# Patient Record
Sex: MALE | Race: WHITE | Employment: OTHER | URBAN - METROPOLITAN AREA
[De-identification: names, ages, dates, MRNs, and addresses within clinical notes are randomized per-mention and may not be internally consistent; named-entity substitution may affect disease eponyms.]

---

## 2021-12-15 ENCOUNTER — OFFICE VISIT (OUTPATIENT)
Dept: ORTHOPEDIC SURGERY | Age: 66
End: 2021-12-15
Payer: MEDICARE

## 2021-12-15 VITALS — HEIGHT: 73 IN | WEIGHT: 248 LBS | BODY MASS INDEX: 32.87 KG/M2

## 2021-12-15 DIAGNOSIS — M17.12 OSTEOARTHRITIS OF LEFT KNEE, UNSPECIFIED OSTEOARTHRITIS TYPE: Primary | ICD-10-CM

## 2021-12-15 PROCEDURE — 99214 OFFICE O/P EST MOD 30 MIN: CPT | Performed by: ORTHOPAEDIC SURGERY

## 2021-12-15 PROCEDURE — G8432 DEP SCR NOT DOC, RNG: HCPCS | Performed by: ORTHOPAEDIC SURGERY

## 2021-12-15 PROCEDURE — 3017F COLORECTAL CA SCREEN DOC REV: CPT | Performed by: ORTHOPAEDIC SURGERY

## 2021-12-15 PROCEDURE — 1101F PT FALLS ASSESS-DOCD LE1/YR: CPT | Performed by: ORTHOPAEDIC SURGERY

## 2021-12-15 PROCEDURE — G8417 CALC BMI ABV UP PARAM F/U: HCPCS | Performed by: ORTHOPAEDIC SURGERY

## 2021-12-15 PROCEDURE — G8536 NO DOC ELDER MAL SCRN: HCPCS | Performed by: ORTHOPAEDIC SURGERY

## 2021-12-15 PROCEDURE — G8427 DOCREV CUR MEDS BY ELIG CLIN: HCPCS | Performed by: ORTHOPAEDIC SURGERY

## 2021-12-15 PROCEDURE — 20610 DRAIN/INJ JOINT/BURSA W/O US: CPT | Performed by: ORTHOPAEDIC SURGERY

## 2021-12-15 RX ORDER — BUPIVACAINE HYDROCHLORIDE 2.5 MG/ML
1 INJECTION, SOLUTION INFILTRATION; PERINEURAL ONCE
Status: COMPLETED | OUTPATIENT
Start: 2021-12-15 | End: 2021-12-15

## 2021-12-15 RX ORDER — VALSARTAN 160 MG/1
TABLET ORAL
COMMUNITY
End: 2022-08-08

## 2021-12-15 RX ORDER — AMOXICILLIN 875 MG/1
TABLET, FILM COATED ORAL
Status: ON HOLD | COMMUNITY
End: 2022-01-20 | Stop reason: CLARIF

## 2021-12-15 RX ORDER — LIDOCAINE HYDROCHLORIDE 10 MG/ML
1 INJECTION INFILTRATION; PERINEURAL ONCE
Status: COMPLETED | OUTPATIENT
Start: 2021-12-15 | End: 2021-12-15

## 2021-12-15 RX ORDER — HYDROGEN PEROXIDE 3 %
SOLUTION, NON-ORAL MISCELLANEOUS
COMMUNITY
End: 2022-08-08

## 2021-12-15 RX ORDER — METHYLPREDNISOLONE ACETATE 40 MG/ML
80 INJECTION, SUSPENSION INTRA-ARTICULAR; INTRALESIONAL; INTRAMUSCULAR; SOFT TISSUE ONCE
Status: COMPLETED | OUTPATIENT
Start: 2021-12-15 | End: 2021-12-15

## 2021-12-15 RX ORDER — DEXTROMETHORPHAN HYDROBROMIDE, GUAIFENESIN 5; 100 MG/5ML; MG/5ML
LIQUID ORAL
Status: ON HOLD | COMMUNITY
End: 2022-01-20 | Stop reason: CLARIF

## 2021-12-15 RX ADMIN — BUPIVACAINE HYDROCHLORIDE 2.5 MG: 2.5 INJECTION, SOLUTION INFILTRATION; PERINEURAL at 10:07

## 2021-12-15 RX ADMIN — LIDOCAINE HYDROCHLORIDE 1 ML: 10 INJECTION INFILTRATION; PERINEURAL at 10:08

## 2021-12-15 RX ADMIN — METHYLPREDNISOLONE ACETATE 80 MG: 40 INJECTION, SUSPENSION INTRA-ARTICULAR; INTRALESIONAL; INTRAMUSCULAR; SOFT TISSUE at 10:08

## 2021-12-15 NOTE — PROGRESS NOTES
ASSESSMENT/PLAN:  Below is the assessment and plan developed based on review of pertinent history, physical exam, labs, studies, and medications. 1. Osteoarthritis of left knee, unspecified osteoarthritis type      No follow-ups on file. We discussed the possibility of an aspiration of the knee followed by an injection of a steroid mixed with a local anesthetic to relieve pain and decrease swelling and inflammation of the right knee. The risks of a steroid injection which include but are not limited to cartilage damage, death of nearby bone, joint infection, nerve damage, temporary facial flushing, temporary steroid flare of pain and inflammation in the joint, temporary increase in blood sugar, tendon weakening or rupture, thinning of nearby bone (osteoporosis), thinning of skin and soft tissue around the injection site, and whitening or lightening of the skin around the injection site were reviewed at length. We specifically advised that patients with diabetes talk to their primary care to ensure they are safe for a steroid injection due to the transient increase in blood sugar associated with the injection. We discussed the chance of increased bleeding and bruising if the patient is on blood thinners or certain dietary supplements that have a blood-thinning effect. We advised patients that have an active infection, history of allergic reactions to steroids or take medications that may prohibit them from receiving a steroid injection to talk to their primary care physician before receiving and injection. We also talked about limiting the number of injections because these potential side effects increase with larger doses and repeated use. After explaining the risks and benefits of the procedure and obtaining verbal informed consent from the patient, the proposed area for injection was confirmed with the patient.  After all questions and concerns were addressed, the skin was prepped with alcohol to reduce the chances of infection. The skin was anesthetized with topical ethylene chloride spray and a local anesthetic was injected into the skin around the site of aspiration. After the local anesthetic became affective, the knee fluid was aspirated, and the  mixture of two milliliters of Depo-Medrol (40mg/ml), one milliliter of Lidocaine and one milliliter of Marcaine was injected slowly into the intra-articular space of right knee in a sterile fashion without difficulty. The needle was removed and disposed of in a sterile container. The patient tolerated the injection well and a band-aid was placed on the skin. The patient was counseled on protecting the injection area, avoiding water submersion for 2 days, icing for pain relief and looking for signs and symptoms of infection. We requested that the patient contact us if any symptoms persist greater than 48 hours after the injection. After a long discussion regarding treatment options, we have elected to refer the patient to one of our knee replacement specialist for more comprehensive care of their arthritis. SUBJECTIVE/OBJECTIVE:  Char Steen (: 1955) is a 73 y.o. male, patient,here for evaluation of the No chief complaint on file. .   The patient returns today for follow-up of his left knee. He has known arthritis of the left knee. He underwent an aspiration injection in May 2021. He returns today for follow-up. Physical Exam    Upon physical examination, the patient is well developed, well nourished, alert and oriented times three, with normal mood and affect and walks with an antalgic gait. Upon examination of the left knee, the patient is tender to palpation along the medial joint line, and has an effusion. They have crepitus of the patellofemoral joint with range of motion and discomfort with patella grind testing. The patient has discomfort with Fina´s maneuvers, and the knee is stable.  They lack full flexion secondary to the effusion, but have full extension. They have 5/5 strength, and are neurovascularly intact distally. There is no erythema, warmth or skin lesions present. On examination of the contralateral extremity, the patient is nontender to palpation and has excellent range of motion, stability and strength. Imaging:    I have reviewed the patient´s previous diagnostic tests in an effort to support the diagnosis and treatment options. Not on File    No current outpatient medications on file. No current facility-administered medications for this visit. No past medical history on file. No past surgical history on file. No family history on file. Social History     Socioeconomic History    Marital status: Not on file     Spouse name: Not on file    Number of children: Not on file    Years of education: Not on file    Highest education level: Not on file   Occupational History    Not on file   Tobacco Use    Smoking status: Not on file    Smokeless tobacco: Not on file   Substance and Sexual Activity    Alcohol use: Not on file    Drug use: Not on file    Sexual activity: Not on file   Other Topics Concern    Not on file   Social History Narrative    Not on file     Social Determinants of Health     Financial Resource Strain:     Difficulty of Paying Living Expenses: Not on file   Food Insecurity:     Worried About Running Out of Food in the Last Year: Not on file    Nelida of Food in the Last Year: Not on file   Transportation Needs:     Lack of Transportation (Medical): Not on file    Lack of Transportation (Non-Medical):  Not on file   Physical Activity:     Days of Exercise per Week: Not on file    Minutes of Exercise per Session: Not on file   Stress:     Feeling of Stress : Not on file   Social Connections:     Frequency of Communication with Friends and Family: Not on file    Frequency of Social Gatherings with Friends and Family: Not on file    Attends Roman [employer] Services: Not on file    Active Member of Clubs or Organizations: Not on file    Attends Club or Organization Meetings: Not on file    Marital Status: Not on file   Intimate Partner Violence:     Fear of Current or Ex-Partner: Not on file    Emotionally Abused: Not on file    Physically Abused: Not on file    Sexually Abused: Not on file   Housing Stability:     Unable to Pay for Housing in the Last Year: Not on file    Number of [place] in the Last Year: Not on file    Unstable Housing in the Last Year: Not on file       Review of Systems    No flowsheet data found. Vitals: There were no vitals taken for this visit. There is no height or weight on file to calculate BMI. An electronic signature was used to authenticate this note.   -- Rosalina Anderson MD

## 2022-01-17 ENCOUNTER — TRANSCRIBE ORDER (OUTPATIENT)
Dept: REGISTRATION | Age: 67
End: 2022-01-17

## 2022-01-17 ENCOUNTER — HOSPITAL ENCOUNTER (OUTPATIENT)
Dept: PREADMISSION TESTING | Age: 67
Discharge: HOME | End: 2022-01-17
Attending: INTERNAL MEDICINE
Payer: MEDICARE

## 2022-01-17 DIAGNOSIS — U07.1 COVID-19: Primary | ICD-10-CM

## 2022-01-17 DIAGNOSIS — U07.1 COVID-19: ICD-10-CM

## 2022-01-17 LAB
SARS-COV-2, XPLCVT: NOT DETECTED
SOURCE, COVRS: NORMAL

## 2022-01-17 PROCEDURE — U0005 INFEC AGEN DETEC AMPLI PROBE: HCPCS

## 2022-01-20 ENCOUNTER — ANESTHESIA EVENT (OUTPATIENT)
Dept: ENDOSCOPY | Age: 67
End: 2022-01-20
Payer: MEDICARE

## 2022-01-20 ENCOUNTER — ANESTHESIA (OUTPATIENT)
Dept: ENDOSCOPY | Age: 67
End: 2022-01-20
Payer: MEDICARE

## 2022-01-20 ENCOUNTER — HOSPITAL ENCOUNTER (OUTPATIENT)
Age: 67
Setting detail: OUTPATIENT SURGERY
Discharge: HOME | End: 2022-01-20
Attending: INTERNAL MEDICINE | Admitting: INTERNAL MEDICINE
Payer: MEDICARE

## 2022-01-20 VITALS
SYSTOLIC BLOOD PRESSURE: 150 MMHG | HEIGHT: 73 IN | BODY MASS INDEX: 34.43 KG/M2 | WEIGHT: 259.8 LBS | OXYGEN SATURATION: 99 % | RESPIRATION RATE: 18 BRPM | DIASTOLIC BLOOD PRESSURE: 91 MMHG | HEART RATE: 73 BPM | TEMPERATURE: 97.8 F

## 2022-01-20 PROCEDURE — 76040000019: Performed by: INTERNAL MEDICINE

## 2022-01-20 PROCEDURE — 74011000250 HC RX REV CODE- 250: Performed by: NURSE PRACTITIONER

## 2022-01-20 PROCEDURE — 74011250636 HC RX REV CODE- 250/636: Performed by: INTERNAL MEDICINE

## 2022-01-20 PROCEDURE — 77030018712 HC DEV BLLN INFL BSC -B: Performed by: INTERNAL MEDICINE

## 2022-01-20 PROCEDURE — 76060000031 HC ANESTHESIA FIRST 0.5 HR: Performed by: INTERNAL MEDICINE

## 2022-01-20 PROCEDURE — 2709999900 HC NON-CHARGEABLE SUPPLY: Performed by: INTERNAL MEDICINE

## 2022-01-20 PROCEDURE — 74011250636 HC RX REV CODE- 250/636: Performed by: NURSE PRACTITIONER

## 2022-01-20 PROCEDURE — C1726 CATH, BAL DIL, NON-VASCULAR: HCPCS | Performed by: INTERNAL MEDICINE

## 2022-01-20 RX ORDER — NALOXONE HYDROCHLORIDE 0.4 MG/ML
0.4 INJECTION, SOLUTION INTRAMUSCULAR; INTRAVENOUS; SUBCUTANEOUS
Status: DISCONTINUED | OUTPATIENT
Start: 2022-01-20 | End: 2022-01-20 | Stop reason: HOSPADM

## 2022-01-20 RX ORDER — LIDOCAINE HYDROCHLORIDE 20 MG/ML
INJECTION, SOLUTION EPIDURAL; INFILTRATION; INTRACAUDAL; PERINEURAL AS NEEDED
Status: DISCONTINUED | OUTPATIENT
Start: 2022-01-20 | End: 2022-01-20 | Stop reason: HOSPADM

## 2022-01-20 RX ORDER — SODIUM CHLORIDE 9 MG/ML
50 INJECTION, SOLUTION INTRAVENOUS CONTINUOUS
Status: DISCONTINUED | OUTPATIENT
Start: 2022-01-20 | End: 2022-01-20 | Stop reason: HOSPADM

## 2022-01-20 RX ORDER — EPINEPHRINE 0.1 MG/ML
1 INJECTION INTRACARDIAC; INTRAVENOUS
Status: DISCONTINUED | OUTPATIENT
Start: 2022-01-20 | End: 2022-01-20 | Stop reason: HOSPADM

## 2022-01-20 RX ORDER — SODIUM CHLORIDE 0.9 % (FLUSH) 0.9 %
5-40 SYRINGE (ML) INJECTION EVERY 8 HOURS
Status: DISCONTINUED | OUTPATIENT
Start: 2022-01-20 | End: 2022-01-20 | Stop reason: HOSPADM

## 2022-01-20 RX ORDER — FLUMAZENIL 0.1 MG/ML
0.2 INJECTION INTRAVENOUS
Status: DISCONTINUED | OUTPATIENT
Start: 2022-01-20 | End: 2022-01-20 | Stop reason: HOSPADM

## 2022-01-20 RX ORDER — MIDAZOLAM HYDROCHLORIDE 1 MG/ML
.25-5 INJECTION, SOLUTION INTRAMUSCULAR; INTRAVENOUS
Status: DISCONTINUED | OUTPATIENT
Start: 2022-01-20 | End: 2022-01-20 | Stop reason: HOSPADM

## 2022-01-20 RX ORDER — ATROPINE SULFATE 0.1 MG/ML
0.5 INJECTION INTRAVENOUS
Status: DISCONTINUED | OUTPATIENT
Start: 2022-01-20 | End: 2022-01-20 | Stop reason: HOSPADM

## 2022-01-20 RX ORDER — SODIUM CHLORIDE 0.9 % (FLUSH) 0.9 %
5-40 SYRINGE (ML) INJECTION AS NEEDED
Status: DISCONTINUED | OUTPATIENT
Start: 2022-01-20 | End: 2022-01-20 | Stop reason: HOSPADM

## 2022-01-20 RX ORDER — SODIUM CHLORIDE, SODIUM LACTATE, POTASSIUM CHLORIDE, CALCIUM CHLORIDE 600; 310; 30; 20 MG/100ML; MG/100ML; MG/100ML; MG/100ML
INJECTION, SOLUTION INTRAVENOUS
Status: DISCONTINUED | OUTPATIENT
Start: 2022-01-20 | End: 2022-01-20 | Stop reason: HOSPADM

## 2022-01-20 RX ORDER — FENTANYL CITRATE 50 UG/ML
25-200 INJECTION, SOLUTION INTRAMUSCULAR; INTRAVENOUS
Status: DISCONTINUED | OUTPATIENT
Start: 2022-01-20 | End: 2022-01-20 | Stop reason: HOSPADM

## 2022-01-20 RX ORDER — PROPOFOL 10 MG/ML
INJECTION, EMULSION INTRAVENOUS AS NEEDED
Status: DISCONTINUED | OUTPATIENT
Start: 2022-01-20 | End: 2022-01-20 | Stop reason: HOSPADM

## 2022-01-20 RX ORDER — DEXTROMETHORPHAN/PSEUDOEPHED 2.5-7.5/.8
1.2 DROPS ORAL
Status: DISCONTINUED | OUTPATIENT
Start: 2022-01-20 | End: 2022-01-20 | Stop reason: HOSPADM

## 2022-01-20 RX ADMIN — PROPOFOL 100 MG: 10 INJECTION, EMULSION INTRAVENOUS at 13:55

## 2022-01-20 RX ADMIN — SODIUM CHLORIDE, SODIUM LACTATE, POTASSIUM CHLORIDE, AND CALCIUM CHLORIDE: 600; 310; 30; 20 INJECTION, SOLUTION INTRAVENOUS at 13:51

## 2022-01-20 RX ADMIN — LIDOCAINE HYDROCHLORIDE 100 MG: 20 INJECTION, SOLUTION EPIDURAL; INFILTRATION; INTRACAUDAL; PERINEURAL at 13:55

## 2022-01-20 RX ADMIN — PROPOFOL 40 MG: 10 INJECTION, EMULSION INTRAVENOUS at 14:01

## 2022-01-20 RX ADMIN — PROPOFOL 40 MG: 10 INJECTION, EMULSION INTRAVENOUS at 13:58

## 2022-01-20 NOTE — ANESTHESIA POSTPROCEDURE EVALUATION
Post-Anesthesia Evaluation and Assessment    Patient: Negin Simmons MRN: [id number]  SSN: [id number]    YOB: 1955  Age: 73 y.o. Sex: male      I have evaluated the patient and they are stable and ready for discharge from the PACU. Cardiovascular Function/Vital Signs  Visit Vitals  BP (!) 138/99   Pulse 72   Temp 36.6 °C (97.8 °F)   Resp 23   Ht 6' 1\" (1.854 m)   Wt 117.8 kg (259 lb 12.8 oz)   SpO2 96%   BMI 34.28 kg/m²       Patient is status post MAC anesthesia for Procedure(s):  ESOPHAGOGASTRODUODENOSCOPY (EGD)    :-  ESOPHAGEAL DILATION. Nausea/Vomiting: None    Postoperative hydration reviewed and adequate. Pain:  Pain Scale 1: Numeric (0 - 10) (01/20/22 1410)  Pain Intensity 1: 0 (01/20/22 1410)   Managed    Neurological Status: At baseline    Mental Status, Level of Consciousness: Alert and  oriented to person, place, and time    Pulmonary Status:   O2 Device: None (Room air) (01/20/22 1410)   Adequate oxygenation and airway patent    Complications related to anesthesia: None    Post-anesthesia assessment completed. No concerns    Signed By: Babatunde Mchugh MD     January 20, 2022              Procedure(s):  ESOPHAGOGASTRODUODENOSCOPY (EGD)    :-  ESOPHAGEAL DILATION. MAC    <BSHSIANPOST>    INITIAL Post-op Vital signs:   Vitals Value Taken Time   /105 01/20/22 1420   Temp 36.6 °C (97.8 °F) 01/20/22 1410   Pulse 68 01/20/22 1423   Resp 15 01/20/22 1423   SpO2 96 % 01/20/22 1423   Vitals shown include unvalidated device data.

## 2022-01-20 NOTE — DISCHARGE INSTRUCTIONS
[address] [facility]  [address] [facility], [address] [place] [address]    EGD DISCHARGE INSTRUCTIONS    Edward Gibson  [id number]  1955    Discomfort:  Sore throat- throat lozenges or warm salt water gargle  redness at IV site- apply warm compress to area; if redness or soreness persist- contact your physician  Gaseous discomfort- walking, belching will help relieve any discomfort  You may not operate a vehicle for 12 hours  You may not engage in an occupation involving machinery or appliances for rest of today  You may not drink alcoholic beverages for at least 12 hours  Avoid making any critical decisions for at least 24 hour  DIET  You may resume your regular diet - however -  remember your colon is empty and a heavy meal will produce gas. Avoid these foods:  vegetables, fried / greasy foods, carbonated drinks    ACTIVITY  You may resume your normal daily activities   Spend the remainder of the day resting -  avoid any strenuous activity. CALL M.D. ANY SIGN OF   Increasing pain, nausea, vomiting  Abdominal distension (swelling)  New increased bleeding (oral or rectal)  Fever (chills)  Pain in chest area  Bloody discharge from nose or mouth  Shortness of breath    Follow-up Instructions:   Call Dr. Katherine Holly for any questions or problems and follow up with him in 2 to 3 months  Telephone # [phone]    ENDOSCOPY FINDINGS:   Your endoscopy showed medium size hiatal hernia and benign esophageal ring, I dilated. Signed By: Katherine Holly MD     1/20/2022  2:10 PM         Learning About Coronavirus (COVID-19)  Coronavirus (COVID-19): Overview  What is coronavirus ([id number])? The coronavirus disease (COVID-19) is caused by a virus. It is an illness that was first found in [place], [place], in December 2019. It has since spread worldwide. The virus can cause fever, cough, and trouble breathing. In severe cases, it can cause pneumonia and make it hard to breathe without help.  It can cause death.  Coronaviruses are a large group of viruses. They cause the common cold. They also cause more serious illnesses like Middle East respiratory syndrome (MERS) and severe acute respiratory syndrome (SARS). COVID-19 is caused by a novel coronavirus. That means it's a new type that has not been seen in people before. This virus spreads person-to-person through droplets from coughing and sneezing. It can also spread when you are close to someone who is infected. And it can spread when you touch something that has the virus on it, such as a doorknob or a tabletop. What can you do to protect yourself from coronavirus (COVID-19)? The best way to protect yourself from getting sick is to:  · Avoid areas where there is an outbreak. · Avoid contact with people who may be infected. · Wash your hands often with soap or alcohol-based hand sanitizers. · Avoid crowds and try to stay at least 6 feet away from other people. · Wash your hands often, especially after you cough or sneeze. Use soap and water, and scrub for at least 20 seconds. If soap and water aren't available, use an alcohol-based hand . · Avoid touching your mouth, nose, and eyes. What can you do to avoid spreading the virus to others? To help avoid spreading the virus to others:  · Cover your mouth with a tissue when you cough or sneeze. Then throw the tissue in the trash. · Use a disinfectant to clean things that you touch often. · Stay home if you are sick or have been exposed to the virus. Don't go to school, work, or public areas. And don't use public transportation. · If you are sick:  ? Leave your home only if you need to get medical care. But call the doctor's office first so they know you're coming. And wear a face mask, if you have one.  ? If you have a face mask, wear it whenever you're around other people. It can help stop the spread of the virus when you cough or sneeze. ? Clean and disinfect your home every day.  Use household  and disinfectant wipes or sprays. Take special care to clean things that you grab with your hands. These include doorknobs, remote controls, phones, and handles on your refrigerator and microwave. And don't forget countertops, tabletops, bathrooms, and computer keyboards. When to call for help  Call 911 anytime you think you may need emergency care. For example, call if:  · You have severe trouble breathing. (You can't talk at all.)  · You have constant chest pain or pressure. · You are severely dizzy or lightheaded. · You are confused or can't think clearly. · Your face and lips have a blue color. · You pass out (lose consciousness) or are very hard to wake up. Call your doctor now if you develop symptoms such as:  · Shortness of breath. · Fever. · Cough. If you need to get care, call ahead to the doctor's office for instructions before you go. Make sure you wear a face mask, if you have one, to prevent exposing other people to the virus. Where can you get the latest information? The following health organizations are tracking and studying this virus. Their websites contain the most up-to-date information. Shari Purnima also learn what to do if you think you may have been exposed to the virus. · U.S. Centers for Disease Control and Prevention (CDC): The CDC provides updated news about the disease and travel advice. The website also tells you how to prevent the spread of infection. www.cdc.gov  · World Health Organization [facility]): WHO offers information about the virus outbreaks. WHO also has travel advice. www.who.int  Current as of: April 1, 2020               Content Version: 12.4  © 2252-4219 [employer], [employer]orporated. Care instructions adapted under license by your healthcare professional. If you have questions about a medical condition or this instruction, always ask your healthcare professional. [address] any warranty or liability for your use of this information.

## 2022-01-20 NOTE — PROCEDURES
[address] [facility] [place]  174 [facility], [address] [facility] (EGD) Procedure Note    Baltazar Romo  1955  [id number]      Procedure: Endoscopic Gastroduodenoscopy with esophageal dilation    Indication:  Dysphagia/odynophagia , heartburn despite therapy    Pre-operative Diagnosis: see indication above    Post-operative Diagnosis: see findings below    : Mohini Cartagena MD    Surgical Assistant: Endoscopy Technician-1: Sukumar Romero  Endoscopy RN-1: Josefa Rolle RN    Implants:  None    Referring Provider:  Enzo Elmore MD      Anesthesia/Sedation:  MAC anesthesia Propofol        Procedure Details     After infomed consent was obtained for the procedure, with all risks and benefits of procedure explained the patient was taken to the endoscopy suite and placed in the left lateral decubitus position. Following sequential administration of sedation as per above, the endoscope was inserted into the mouth and advanced under direct vision to third portion of the duodenum. A careful inspection was made as the gastroscope was withdrawn, including a retroflexed view of the proximal stomach; findings and interventions are described below. Findings:   Esophagus:hiatal hernia 5 cm in size     schatzki ring at G-E junction, I dilated gradually with CRE balloon ( 15, 16.5, then 18 mm), kept at 18 mm for 1 minute  Stomach: normal   Duodenum/jejunum: normal      Therapies:  As above    Specimens: none         EBL: None      Complications:   None; patient tolerated the procedure well. Impression:    -See post-procedure diagnoses. Recommendations:  -Continue on  acid suppression.   -f/u in 3 months  -I also suspect his symptoms are from his hiatal hernia, may require surgical repair if no improvement    Signed By: Mohini Cartagena MD     1/20/2022  2:05 PM

## 2022-01-20 NOTE — H&P
The patient is a 73year old male who presents with a complaint of difficulty swallowing. The patient presents for due to new symptoms. Note for \"Difficulty swallowing \": 59yo male with hx schatzki ring, hiatal hernia, colon polyps, perforated diverticulitis s/p colostomy and reversal, is seen today via phone visit for dysphagia x6 weeks with solids, occasional dyphagia with liquids. Reports heartburn, worse at night. Taking prilosec 20mg about 3 tabs per week. Sx without pattern. No weight loss, melena, hematochezia, abd pain, n/v, diarrhea, constipation, or any other complaints. No NSAIDs or blood thinners. No family hx colon or gastric cancer or other GI disease. 1/5/21: He had resolution of dysphagia after EGD/dilation last year. He has reoccurrence of dysphagia over the past 2-3 months. He also has worsening GERD despite PPI, he follows GERD diet and uses H2 blocker PRN. Problem List/Past Medical (Jayashree Mcginnis; 1/5/2022 12:20 PM)  Dysphagia (R13.10)    History of colonic polyps (Z86.010)    Colon cancer screening (Z12.11)    Diverticulitis Of Colon    Hiatal Hernia    Hypertension      Past Surgical History (Jayashree Thompsonpe; 1/5/2022 12:20 PM)  H/O colostomy (Z87.19)    History of colostomy reversal (Z98.890)    Hernia Repair    Tonsillectomy    Oral Surgery    Vein Surgery   Bilateral.  Foot surgery   Left. Allergies (Jayashree Mgcinnis; 1/5/2022 12:20 PM)  POISON OAK    Poison Ivy      Medication History (Jayashree Mcginnis; 1/5/2022 12:20 PM)  Omeprazole  (40MG Capsule DR, 1 (one) Oral take once daily, Taken starting 07/14/2021) Active. Losartan Potassium  (100MG Tablet, 1 tab Oral daily) Active. Medications Reconciled     Family History (Jayashree Mcginnis; 1/5/2022 12:20 PM)  Completed Stroke   Mother. Social History (Jayashree Mcginnis; 1/5/2022 12:20 PM)  Alcohol Use   Has never drank. Marital status   . Employment status   Retired. Tobacco Use   Never smoker.   Blood Transfusion   Yes.    Diagnostic Studies History (Leighton Iron; 1/5/2022 12:20 PM)  Endoscopy   [10/04/2011]:  Colonoscopy   [07/23/2019]:    Health Maintenance History (Leighton Iron; 1/5/2022 12:20 PM)  Pneumovax   none  Flu Vaccine   [10/2020]:        Review of Systems (Leighton Iron; 1/5/2022 12:20 PM)  General Not Present- Chronic Fatigue, Poor Appetite, Weight Gain and Weight Loss. Skin Not Present- Itching, Rash and Skin Color Changes. HEENT Not Present- Hearing Loss and Vertigo. Respiratory Not Present- Difficulty Breathing and TB exposure. Cardiovascular Not Present- Chest Pain, Use of Antibiotics before Dental Procedures and Use of Blood Thinners. Gastrointestinal Present- See HPI. Musculoskeletal Not Present- Arthritis, Hip Replacement Surgery and Knee Replacement Surgery. Neurological Not Present- Weakness. Psychiatric Not Present- Depression. Endocrine Not Present- Diabetes and Thyroid Problems. Hematology Not Present- Anemia. Vitals (Leighton Iron; 1/5/2022 12:22 PM)  1/5/2022 12:20 PM  Weight: 255 lb   Height: 73 in   Weight was reported by patient. Height was reported by patient. Body Surface Area: 2.39 m²   Body Mass Index: 33.64 kg/m²                Assessment & Plan Vidhi Arroyo [facility]; 1/5/2022 1:07 PM)  Dysphagia (R13.10)  Story: EGD 11/2020: moderate hiatal hernia, ring GE junction - dilated; esophagus biopsy unremarkable    7/2019 - colonoscopy - polyps, internal hemorrhoids, normal appearing anastamosis, 5yr recall recommended. Impression: He has reoccurrence of dysphagia and worsening heartburn. Will further evaluate with EGD. Current Plans  Due to this being a TeleHealth Phone Call encounter (During ALFPI-80 public health emergency), evaluation of the following organ systems was limited: Vitals/Constitutional/EENT/Resp/CV/GI//MS/Neuro/Skin/Heme-Lymph-Imm.  Pursuant to the emergency declaration under the [facility], [facility]5 waiver authority and the Coronavirus Preparedness and Response Supplemental Appropriations Act, this Phone Call Visit was conducted with patient's (and/or legal guardian's) consent, to reduce the risk of exposure to COVID-19 and provide necessary medical care. Services were provided through a phone call discussion to substitute for in-person encounter. Pt Education - How to access health information online: discussed with patient and provided information. ENDOSCOPY, UPPER GI, DIAGNOSTIC ([id number])  Patient is to call me for any questions or concerns. Follow up office visit after procedure  This patient was reviewed and seen in collaboration with Dr. Joan Easton. Date of Surgery Update:  Fernando Zuluaga was seen and examined. History and physical has been reviewed. The patient has been examined. There have been no significant clinical changes since the completion of the originally dated History and Physical.  The patient was counseled at length about the risks of facundo Covid-19 in the jie-operative and post-operative states including the recovery window of their procedure. The patient was made aware that facundo Covid-19 after a surgical procedure may worsen their prognosis for recovering from the virus and lend to a higher morbidity and or mortality risk. The patient was given the options of postponing their procedure. All of the risks, benefits, and alternatives were discussed. The patient does  wish to proceed with the procedure.     Signed By: Joan Easton MD     January 20, 2022 1:50 PM

## 2022-01-20 NOTE — ANESTHESIA PREPROCEDURE EVALUATION
Relevant Problems   No relevant active problems       Anesthetic History   No history of anesthetic complications            Review of Systems / Medical History  Patient summary reviewed, nursing notes reviewed and pertinent labs reviewed    Pulmonary  Within defined limits                 Neuro/Psych   Within defined limits           Cardiovascular    Hypertension              Exercise tolerance: >4 METS     GI/Hepatic/Renal     GERD: well controlled           Endo/Other        Arthritis     Other Findings              Physical Exam    Airway  Mallampati: II  TM Distance: > 6 cm  Neck ROM: normal range of motion   Mouth opening: Normal     Cardiovascular  Regular rate and rhythm,  S1 and S2 normal,  no murmur, click, rub, or gallop             Dental  No notable dental hx       Pulmonary  Breath sounds clear to auscultation               Abdominal  GI exam deferred       Other Findings            Anesthetic Plan    ASA: 2  Anesthesia type: MAC          Induction: Intravenous  Anesthetic plan and risks discussed with: Patient

## 2022-01-20 NOTE — PROGRESS NOTES
CRE balloon dilatation of the esophagus   18 mm Balloon inflated to 7 ATMs and held for 60 seconds. 0 mm Balloon inflated to 0 ATMs and held for 0 seconds. 0 mm Balloon inflated to 0 ATMs and held for 0 seconds. No subcutaneous crepitus of the chest or cervical region was noted post dilatation.

## 2022-04-08 ENCOUNTER — OFFICE VISIT (OUTPATIENT)
Dept: ORTHOPEDIC SURGERY | Age: 67
End: 2022-04-08
Payer: MEDICARE

## 2022-04-08 VITALS — HEIGHT: 73 IN | WEIGHT: 259 LBS | BODY MASS INDEX: 34.33 KG/M2

## 2022-04-08 DIAGNOSIS — M25.561 ACUTE PAIN OF RIGHT KNEE: Primary | ICD-10-CM

## 2022-04-08 DIAGNOSIS — M17.11 PRIMARY OSTEOARTHRITIS OF RIGHT KNEE: ICD-10-CM

## 2022-04-08 PROCEDURE — 1101F PT FALLS ASSESS-DOCD LE1/YR: CPT | Performed by: ORTHOPAEDIC SURGERY

## 2022-04-08 PROCEDURE — G8417 CALC BMI ABV UP PARAM F/U: HCPCS | Performed by: ORTHOPAEDIC SURGERY

## 2022-04-08 PROCEDURE — 99213 OFFICE O/P EST LOW 20 MIN: CPT | Performed by: ORTHOPAEDIC SURGERY

## 2022-04-08 PROCEDURE — G8536 NO DOC ELDER MAL SCRN: HCPCS | Performed by: ORTHOPAEDIC SURGERY

## 2022-04-08 PROCEDURE — 3017F COLORECTAL CA SCREEN DOC REV: CPT | Performed by: ORTHOPAEDIC SURGERY

## 2022-04-08 PROCEDURE — G8432 DEP SCR NOT DOC, RNG: HCPCS | Performed by: ORTHOPAEDIC SURGERY

## 2022-04-08 PROCEDURE — G8427 DOCREV CUR MEDS BY ELIG CLIN: HCPCS | Performed by: ORTHOPAEDIC SURGERY

## 2022-04-08 RX ORDER — DICLOFENAC SODIUM 75 MG/1
75 TABLET, DELAYED RELEASE ORAL 2 TIMES DAILY
Qty: 60 TABLET | Refills: 3 | Status: SHIPPED | OUTPATIENT
Start: 2022-04-08 | End: 2022-08-29

## 2022-04-08 RX ORDER — OMEPRAZOLE 40 MG/1
40 CAPSULE, DELAYED RELEASE ORAL DAILY
COMMUNITY
Start: 2022-01-19 | End: 2022-08-08

## 2022-04-08 RX ORDER — ACETAMINOPHEN AND CODEINE PHOSPHATE 300; 30 MG/1; MG/1
1 TABLET ORAL
COMMUNITY
Start: 2022-03-11 | End: 2022-08-08

## 2022-04-08 RX ORDER — AMOXICILLIN AND CLAVULANATE POTASSIUM 500; 125 MG/1; MG/1
1 TABLET, FILM COATED ORAL EVERY 12 HOURS
COMMUNITY
Start: 2022-03-11 | End: 2022-08-08

## 2022-04-08 NOTE — PROGRESS NOTES
ASSESSMENT/PLAN:  Below is the assessment and plan developed based on review of pertinent history, physical exam, labs, studies, and medications. 1. Osteoarthritis of left knee, unspecified osteoarthritis type      44-year-old male with bilateral knee osteoarthritis. He is here today to discuss his right knee. His x-rays were reviewed with him in detail and his questions were answered to his satisfaction. I offered a cortisone injection today which she would like to hold off on. I did prescribe him diclofenac to help with his acute symptoms. He will follow up with a total joint specialist in the future. We are happy to see him back though for any other issues he may have. SUBJECTIVE/OBJECTIVE:  Tereso Lion (: 1955) is a 73 y.o. male, patient,here for evaluation of the No chief complaint on file. .   The patient returns today discuss his right knee. He has known arthritis of the left knee. He underwent an aspiration injection on the left knee in 2022. He denies any trauma or inciting event that may have triggered his symptoms in his right knee. He does state he had a prior surgery on the knee when he was younger. The pain is mainly in the medial aspect of the knee. He denies any radiation of pain or any numbness or tingling. He takes Tylenol for symptoms. Physical Exam    Upon physical examination, the patient is well developed, well nourished, alert and oriented times three, with normal mood and affect and walks with an antalgic gait. Upon examination of the right knee, the patient is tender to palpation along the medial joint line, and has an effusion. They have crepitus of the patellofemoral joint with range of motion and discomfort with patella grind testing. The patient has discomfort with Fina´s maneuvers, and the knee is stable. They lack full flexion secondary to the effusion, but have full extension.  They have 5/5 strength, and are neurovascularly intact distally. There is no erythema, warmth or skin lesions present. Imaging:    A/P, lateral, tunnel and sunrise views of the right knee were reviewed in the office today and demonstrated no periosteal reaction, no medullary lesions, no osteopenia, and mild lateral compartment arthritis, moderate medial compartment arthritis and moderate patellofemoral arthritis. Not on File    No current outpatient medications on file. No current facility-administered medications for this visit. No past medical history on file. No past surgical history on file. No family history on file. Social History     Socioeconomic History    Marital status: Not on file     Spouse name: Not on file    Number of children: Not on file    Years of education: Not on file    Highest education level: Not on file   Occupational History    Not on file   Tobacco Use    Smoking status: Not on file    Smokeless tobacco: Not on file   Substance and Sexual Activity    Alcohol use: Not on file    Drug use: Not on file    Sexual activity: Not on file   Other Topics Concern    Not on file   Social History Narrative    Not on file     Social Determinants of Health     Financial Resource Strain:     Difficulty of Paying Living Expenses: Not on file   Food Insecurity:     Worried About Running Out of Food in the Last Year: Not on file    Nelida of Food in the Last Year: Not on file   Transportation Needs:     Lack of Transportation (Medical): Not on file    Lack of Transportation (Non-Medical):  Not on file   Physical Activity:     Days of Exercise per Week: Not on file    Minutes of Exercise per Session: Not on file   Stress:     Feeling of Stress : Not on file   Social Connections:     Frequency of Communication with Friends and Family: Not on file    Frequency of Social Gatherings with Friends and Family: Not on file    Attends Tenriism Services: Not on file    Active Member of Clubs or Organizations: Not on file    Attends Club or Organization Meetings: Not on file    Marital Status: Not on file   Intimate Partner Violence:     Fear of Current or Ex-Partner: Not on file    Emotionally Abused: Not on file    Physically Abused: Not on file    Sexually Abused: Not on file   Housing Stability:     Unable to Pay for Housing in the Last Year: Not on file    Number of [place] in the Last Year: Not on file    Unstable Housing in the Last Year: Not on file       Review of Systems    No flowsheet data found. Vitals: There were no vitals taken for this visit. There is no height or weight on file to calculate BMI. An electronic signature was used to authenticate this note.   -- Nimesh Millan MD

## 2022-08-08 ENCOUNTER — OFFICE VISIT (OUTPATIENT)
Dept: ORTHOPEDIC SURGERY | Age: 67
End: 2022-08-08
Payer: MEDICARE

## 2022-08-08 VITALS — BODY MASS INDEX: 32.47 KG/M2 | WEIGHT: 245 LBS | HEIGHT: 73 IN

## 2022-08-08 DIAGNOSIS — M17.12 ARTHRITIS OF LEFT KNEE: Primary | ICD-10-CM

## 2022-08-08 PROCEDURE — G8417 CALC BMI ABV UP PARAM F/U: HCPCS | Performed by: ORTHOPAEDIC SURGERY

## 2022-08-08 PROCEDURE — 20610 DRAIN/INJ JOINT/BURSA W/O US: CPT | Performed by: ORTHOPAEDIC SURGERY

## 2022-08-08 PROCEDURE — G8536 NO DOC ELDER MAL SCRN: HCPCS | Performed by: ORTHOPAEDIC SURGERY

## 2022-08-08 PROCEDURE — 3017F COLORECTAL CA SCREEN DOC REV: CPT | Performed by: ORTHOPAEDIC SURGERY

## 2022-08-08 PROCEDURE — 1123F ACP DISCUSS/DSCN MKR DOCD: CPT | Performed by: ORTHOPAEDIC SURGERY

## 2022-08-08 PROCEDURE — G8432 DEP SCR NOT DOC, RNG: HCPCS | Performed by: ORTHOPAEDIC SURGERY

## 2022-08-08 PROCEDURE — G8427 DOCREV CUR MEDS BY ELIG CLIN: HCPCS | Performed by: ORTHOPAEDIC SURGERY

## 2022-08-08 PROCEDURE — 99215 OFFICE O/P EST HI 40 MIN: CPT | Performed by: ORTHOPAEDIC SURGERY

## 2022-08-08 PROCEDURE — 1101F PT FALLS ASSESS-DOCD LE1/YR: CPT | Performed by: ORTHOPAEDIC SURGERY

## 2022-08-08 RX ORDER — LOSARTAN POTASSIUM 100 MG/1
100 TABLET ORAL DAILY
COMMUNITY

## 2022-08-08 NOTE — PROGRESS NOTES
Lucy Hunt (: 1955) is a 78 y.o. male patient, here for evaluation of the following chief complaint(s):  Knee Pain (Left knee pain/)       ASSESSMENT/PLAN:  Below is the assessment and plan developed based on review of pertinent history, physical exam, labs, studies, and medications. 80-year-old male comes in today for evaluation of left knee pain. Is been going on for 2 years. He has recurrent effusions. He has at least 4 or 5 previous aspiration and injections. He said these have stopped working. The pain bothers him every day and is rated as severe. He limps regularly. He cannot walk 1 block without pain. X-rays reveal end-stage medial joint space narrowing with associated patellofemoral osteoarthritis. We discussed options. He is ready to move forward with surgery. He understands his elevated risk secondary to BMI. He also asked for aspiration today. I aspirated 35 cc of clear yellow fluid. We went ahead and scheduled him for  at [facility].    Risks and benefits of joint arthroplasty discussed at length including but not limited to bleeding, need for blood transfusion, infection, damage to surrounding structures, intra-operative fracture, blood clots, pulmonary embolism, death. The patient understands the risks of surgery. All questions answered. They elected to move forward. 1. Arthritis of left knee      Encounter Diagnosis   Name Primary? Arthritis of left knee Yes        No follow-ups on file. SUBJECTIVE/OBJECTIVE:  Lucy Hunt (: 1955) is a 78 y.o. male who presents today for the following:  Chief Complaint   Patient presents with    Knee Pain     Left knee pain         80-year-old male comes in today for evaluation of left knee pain. Is been going on for 2 years. He has recurrent effusions. He has at least 4 or 5 previous aspiration and injections. He said these have stopped working.   The pain bothers him every day and is rated as severe. He limps regularly. He cannot walk 1 block without pain. IMAGING:  I independently reviewed previous x-rays taken in April including 4 views left knee. He has end-stage medial joint space narrowing with osteophyte formation present. Mild to moderate patellofemoral osteoarthritis      XR Results (most recent):  Results from Appointment encounter on 04/08/22    XR KNEE RT MIN 4 V    Narrative  X-rays were performed in the office today. For detailed interpretation of the x-ray findings please see the patient's office note. Allergies   Allergen Reactions    Ace Inhibitors Cough       Current Outpatient Medications   Medication Sig    losartan (COZAAR) 100 mg tablet Take 100 mg by mouth in the morning. diclofenac EC (VOLTAREN) 75 mg EC tablet Take 1 Tablet by mouth two (2) times a day. No current facility-administered medications for this visit. Past Medical History:   Diagnosis Date    Arthritis     Hypertension     Ill-defined condition     diverticulitis    Ill-defined condition     right arm weakness--polio ? Past Surgical History:   Procedure Laterality Date    FOOT/TOES SURGERY PROC UNLISTED      NV ABDOMEN SURGERY PROC UNLISTED      colon resection due to diverticulitis       No family history on file. Social History     Tobacco Use    Smoking status: Never    Smokeless tobacco: Never   Substance Use Topics    Alcohol use: Never        All systems reviewed x 12 and were negative with the exception of None      No flowsheet data found. Vitals:  Ht 6' 1\" (1.854 m)   Wt 245 lb (111.1 kg)   BMI 32.32 kg/m²    Body mass index is 32.32 kg/m². Physical Exam    General: NAD, well developed, well nourished, alert and oriented x 3. Cardiac: Extremities well perfused    Respiratory: Nonlabored breathing    LLE: Antalgic gait. Large effusion noted. No previous incisions noted. ROM 5-120 degrees.  Grossly stable to varus/valgus stress and anterior/posterior drawer tests. Medial and lateral joint tenderness. 5 degree flexion contracture motor grossly intact. RLE: Normal gait and station. Negative stinchfield. No effusion noted. No previous incisions noted. ROM 0-120 degrees. Grossly stable to varus/valgus stress and anterior/posterior drawer tests. Negative McMurrays. Motor grossly intact. Vascular: Palpable pedal pulses, equal bilaterally. Skin: Warm well perfused, cap refill < 2 sec. An electronic signature was used to authenticate this note.   -- Kadeem Ji MD

## 2022-08-10 DIAGNOSIS — M17.12 ARTHRITIS OF LEFT KNEE: Primary | ICD-10-CM

## 2022-08-29 ENCOUNTER — HOSPITAL ENCOUNTER (OUTPATIENT)
Dept: PREADMISSION TESTING | Age: 67
Discharge: HOME | End: 2022-08-29
Payer: MEDICARE

## 2022-08-29 VITALS
WEIGHT: 238.76 LBS | TEMPERATURE: 98.6 F | SYSTOLIC BLOOD PRESSURE: 129 MMHG | BODY MASS INDEX: 31.64 KG/M2 | DIASTOLIC BLOOD PRESSURE: 83 MMHG | HEART RATE: 96 BPM | HEIGHT: 73 IN

## 2022-08-29 LAB
ABO + RH BLD: NORMAL
ANION GAP SERPL CALC-SCNC: 4 MMOL/L (ref 5–15)
APPEARANCE UR: ABNORMAL
ATRIAL RATE: 89 BPM
BACTERIA URNS QL MICRO: NEGATIVE /HPF
BILIRUB UR QL: NEGATIVE
BLOOD GROUP ANTIBODIES SERPL: NORMAL
BUN SERPL-MCNC: 22 MG/DL (ref 6–20)
BUN/CREAT SERPL: 17 (ref 12–20)
CALCIUM SERPL-MCNC: 9.8 MG/DL (ref 8.5–10.1)
CALCULATED P AXIS, ECG09: 17 DEGREES
CALCULATED R AXIS, ECG10: -29 DEGREES
CALCULATED T AXIS, ECG11: 42 DEGREES
CHLORIDE SERPL-SCNC: 103 MMOL/L (ref 97–108)
CO2 SERPL-SCNC: 31 MMOL/L (ref 21–32)
COLOR UR: ABNORMAL
CREAT SERPL-MCNC: 1.3 MG/DL (ref 0.7–1.3)
DIAGNOSIS, 93000: NORMAL
EPITH CASTS URNS QL MICRO: ABNORMAL /LPF
ERYTHROCYTE [DISTWIDTH] IN BLOOD BY AUTOMATED COUNT: 13.9 % (ref 11.5–14.5)
EST. AVERAGE GLUCOSE BLD GHB EST-MCNC: 114 MG/DL
GLUCOSE SERPL-MCNC: 142 MG/DL (ref 65–100)
GLUCOSE UR STRIP.AUTO-MCNC: NEGATIVE MG/DL
HBA1C MFR BLD: 5.6 % (ref 4–5.6)
HCT VFR BLD AUTO: 53.4 % (ref 36.6–50.3)
HGB BLD-MCNC: 17.3 G/DL (ref 12.1–17)
HGB UR QL STRIP: NEGATIVE
HYALINE CASTS URNS QL MICRO: ABNORMAL /LPF (ref 0–5)
INR PPP: 1 (ref 0.9–1.1)
KETONES UR QL STRIP.AUTO: ABNORMAL MG/DL
LEUKOCYTE ESTERASE UR QL STRIP.AUTO: ABNORMAL
MCH RBC QN AUTO: 28.2 PG (ref 26–34)
MCHC RBC AUTO-ENTMCNC: 32.4 G/DL (ref 30–36.5)
MCV RBC AUTO: 87 FL (ref 80–99)
NITRITE UR QL STRIP.AUTO: NEGATIVE
NRBC # BLD: 0 K/UL (ref 0–0.01)
NRBC BLD-RTO: 0 PER 100 WBC
P-R INTERVAL, ECG05: 152 MS
PH UR STRIP: 5.5 [PH] (ref 5–8)
PLATELET # BLD AUTO: 199 K/UL (ref 150–400)
PMV BLD AUTO: 11.7 FL (ref 8.9–12.9)
POTASSIUM SERPL-SCNC: 4.7 MMOL/L (ref 3.5–5.1)
PROT UR STRIP-MCNC: ABNORMAL MG/DL
PROTHROMBIN TIME: 10.9 SEC (ref 9–11.1)
Q-T INTERVAL, ECG07: 358 MS
QRS DURATION, ECG06: 108 MS
QTC CALCULATION (BEZET), ECG08: 435 MS
RBC # BLD AUTO: 6.14 M/UL (ref 4.1–5.7)
RBC #/AREA URNS HPF: ABNORMAL /HPF (ref 0–5)
SODIUM SERPL-SCNC: 138 MMOL/L (ref 136–145)
SP GR UR REFRACTOMETRY: 1.02 (ref 1–1.03)
SPECIMEN EXP DATE BLD: NORMAL
UA: UC IF INDICATED,UAUC: ABNORMAL
UROBILINOGEN UR QL STRIP.AUTO: 0.2 EU/DL (ref 0.2–1)
VENTRICULAR RATE, ECG03: 89 BPM
WBC # BLD AUTO: 10.8 K/UL (ref 4.1–11.1)
WBC URNS QL MICRO: ABNORMAL /HPF (ref 0–4)

## 2022-08-29 PROCEDURE — 85610 PROTHROMBIN TIME: CPT

## 2022-08-29 PROCEDURE — 85027 COMPLETE CBC AUTOMATED: CPT

## 2022-08-29 PROCEDURE — 81001 URINALYSIS AUTO W/SCOPE: CPT

## 2022-08-29 PROCEDURE — 86900 BLOOD TYPING SEROLOGIC ABO: CPT

## 2022-08-29 PROCEDURE — 83036 HEMOGLOBIN GLYCOSYLATED A1C: CPT

## 2022-08-29 PROCEDURE — 93005 ELECTROCARDIOGRAM TRACING: CPT

## 2022-08-29 PROCEDURE — 80048 BASIC METABOLIC PNL TOTAL CA: CPT

## 2022-08-29 RX ORDER — ACETAMINOPHEN 500 MG
TABLET ORAL
COMMUNITY

## 2022-08-29 NOTE — PERIOP NOTES
[address] INSTRUCTIONS  ORTHOPAEDIC    Surgery Date:   9/7/22    Your surgeon's office or [facility] staff will call you between 4 PM- 8 PM the day before surgery with your arrival time. If your surgery is on a Monday, you will receive a call the preceding Friday. Please report to [facility] Patient Access/Admitting on the 1st floor. Bring your insurance card, photo identification, and any copayment (if applicable). If you are going home the same day of your surgery, you must have a responsible adult to drive you home. You need to have a responsible adult to stay with you the first 24 hours after surgery and you should not drive a car for 24 hours following your surgery. Do NOT eat any solid foods after midnight the night before surgery including candy, mints or gum. You may drink clear liquids from midnight until 1 hour prior to arrival time. You may drink up to 12 ounces at one time every 4 hours. Do NOT drink alcohol or smoke 24 hours before surgery. STOP smoking for 14 days prior as it helps with breathing and healing after surgery. If your arrival time is 3pm or later, you may eat a light breakfast before 8am (toast, bagel-no butter, black coffee, plain tea, fruit juice-no pulp) Please note special instructions, if applicable, below for medications. If you are being admitted to the hospital,please leave personal belongings/luggage in your car until you have an assigned hospital room number. Please wear comfortable clothes. Wear your glasses instead of contacts. We ask that all money, jewelry and valuables be left at home. Wear no make up, particularly mascara, the day of surgery. All body piercings, rings, and jewelry need to be removed and left at home. Please remove any nail polish or artificial nails from your fingernails. Please wear your hair loose or down. Please no pony-tails, buns, or any metal hair accessories.  If you shower the morning of surgery, please do not apply any lotions or powders afterwards. You may wear deodorant. Do not shave any body area within 24 hours of your surgery. Please follow all instructions to avoid any potential surgical cancellation. Should your physical condition change, (i.e. fever, cold, flu, etc.) please notify your surgeon as soon as possible. It is important to be on time. If a situation occurs where you may be delayed, please call:  [phone] / [phone] on the day of surgery. The Preadmission Testing staff can be reached at [phone]. Special instructions: NONE    Current Outpatient Medications   Medication Sig    acetaminophen (Tylenol Extra Strength) 500 mg tablet Take  by mouth every six (6) hours as needed for Pain.    losartan (COZAAR) 100 mg tablet Take 100 mg by mouth in the morning. No current facility-administered medications for this encounter. YOU MUST ONLY TAKE THESE MEDICATIONS THE MORNING OF SURGERY WITH A SIP OF WATER: NONE  MEDICATIONS TO TAKE THE MORNING OF SURGERY ONLY IF NEEDED: TYLENOL IF NEEDED. HOLD these prescription medications BEFORE Surgery: NONE  Ask your surgeon/prescribing physician about when/if to STOP taking these medications: NONE  Stop any non-steroidal anti-inflammatory drugs (i.e. Ibuprofen, Naproxen, Advil, Aleve) 3 days before surgery. You may take Tylenol. STOP all vitamins and herbal supplements 1 week prior to  surgery. If you are currently taking Plavix, Coumadin, or any other blood-thinning/anticoagulant medication contact your prescribing physician for instructions. Preventing Infections Before and After - Your Surgery    IMPORTANT INSTRUCTIONS    You play an important role in your health and preparation for surgery. To reduce the germs on your skin you will need to shower with CHG soap (Chorhexidine gluconate 4%) two times before surgery.     CHG soap (Hibiclens, Hex-A-Clens or store brand)  CHG soap will be provided at your Preadmission Testing (PAT) appointment. If you do not have a PAT appointment before surgery, you may arrange to  CHG soap from our office or purchase CHG soap at a pharmacy, grocery or department store. You need to purchase TWO 4 ounce bottles to use for your 2 showers. Steps to follow:  Carline [place] your hair with your normal shampoo and your body with regular soap and rinse well to remove shampoo and soap from your skin. Wet a clean washcloth and turn off the shower. Put CHG soap on washcloth and apply to your entire body from the neck down. Do not use on your head, face or private parts(genitals). Do not use CHG soap on open sores, wounds or areas of skin irritation. Wash you body gently for 5 minutes. Do not wash your skin too hard. This soap does not create lather. Pay special attention to your underarms and from your belly button to your feet. Turn the shower back on and rinse well to get CHG soap off your body. Pat your skin dry with a clean, dry towel. Do not apply lotions or moisturizer. Put on clean clothes and sleep on fresh bed sheets and do not allow pets to sleep with you. Shower with CHG soap 2 times before your surgery  The evening before your surgery  The morning of your surgery      Tips to help prevent infections after your surgery:  Protect your surgical wound from germs:  Hand washing is the most important thing you and your caregivers can do to prevent infections. Keep your bandage clean and dry! Do not touch your surgical wound. Use clean, freshly washed towels and washcloths every time you shower; do not share bath linens with others. Until your surgical wound is healed, wear clothing and sleep on bed linens each day that are clean and freshly washed. Do not allow pets to sleep in your bed with you or touch your surgical wound. Do not smoke - smoking delays wound healing. This may be a good time to stop smoking.   If you have diabetes, it is important for you to manage your blood sugar levels properly before your surgery as well as after your surgery. Poorly managed blood sugar levels slow down wound healing and prevent you from healing completely. Prevention of Infection  Testing for Staphylococcus aureus on your skin before surgery    Staphylococcus aureus (staph) is a common bacteria that is found on the body. It normally does not cause infection on healthy skin. Before surgery, you will be tested to see if you have staph by swabbing the inside of your nose. When you have an incision with surgery, the goal is to protect that incision from infection. Removal of the staph bacteria before surgery can decrease the risk of a surgical site infection. If your nose swab is positive for staph you will be called. Your treatment will include 2 steps:  Prescription for Mupirocin ointment to be used in each nostril twice a day for 5 days. Showering with Chlorhexidine (CHG) liquid soap for 5 days prior to surgery. How to use Mupirocin ointment in your nose   the prescription from your pharmacy. You will receive a large tube of ointment which will be big enough for all of your treatments. You will apply this ointment to each nostril 2 times a day for 5 days. Wash your hands with  gel or soap and water for 20 seconds before using ointment. Place a pea-sized amount of ointment on a cotton Q-tip. Apply ointment just inside of each nostril with the Q-tip. Do not push Q-tip or ointment deep inside you nose. Press your nostrils together and massage for a few seconds. Wash your hands with  gel or soap and water after you are finished. Do not get ointment near your eyes. If it gets into your eyes, rinse them with cool water. If you need to use nasal spray, clean the tip of the bottle with alcohol before use and do not use both at the same time. If you are scheduled for COVID testing during the 5 days, do NOT apply morning dose until after the COVID test has been performed.      How to use Chlorhexidine (CHG) 4% liquid soap  Purchase an 8 ounce bottle of CHG liquid soap (Chlorhexidine 4%, Hibiclens, Hex-A-Clens or store brand) at a pharmacy or grocery store. Wash your hair with your normal shampoo and your body with regular soap and rinse well to remove shampoo and soap from your skin. Wet a clean washcloth and turn off the shower. Put CHG soap on washcloth and apply to your entire body from the neck down. Do not use on your head, face or private parts(genitals). Do not use CHG soap on open sores, wounds or areas of skin irritation. Wash your body gently for 5 minutes. Do not wash your skin too hard. This soap does not create lather. Pay special attention to your underarms and from your belly button to your feet. Turn the shower back on and rinse well to get CHG soap off your body. Pat your skin dry with a clean, dry towel. Do not apply lotions or moisturizer. Put on clean clothes and sleep on fresh bed sheets the night before surgery. Do not allow pets to sleep with you. Eating and Drinking Before Surgery    You may eat a regular dinner at the usual time on the day before your surgery. Do NOT eat any solid foods after midnight unless your arrival time at the hospital is 3pm or later. You may drink clear liquids only from 12 midnight until 1 hours prior to your arrival time at the hospital on the day of your surgery. Do NOT drink alcohol. Clear liquids include:  Water  Fruit juices without pulp( i.e. apple juice)  Carbonated beverages  Black coffee (no cream/milk)  Tea (no cream/milk)  Gatorade  You may drink up to 12-16 ounces at one time every 4 hours between the hours of midnight and 1 hour before your arrival time at the hospital. Example- if your arrival time at the hospital is 6am, you may drink 12-16 ounces of clear liquids no later than 5am.  If your arrival time at the hospital is 3pm or later, you may eat a light breakfast before 8am.  A light breakfast includes:   Toast or bagel (no butter)  Black coffee (no cream/milk)  Tea (no cream/milk)  Fruit juices without pulp ( i.e. apple juice)  Do NOT eat meat, eggs, vegetables or fruit  If you have any questions, please contact your surgeon's office. Patient Information Regarding COVID Restrictions    Day of Procedure    Please park in the parking deck or any designated visitor parking lot. Enter the facility through the Main Entrance of the hospital.  On the day of surgery, please provide the cell phone number of the person who will be waiting for you to the Patient Access representative at the time of registration. Please wear a mask on the day of your procedure. We are now allowing two designated visitors per stay. Pediatric patients may have 2 designated visitors. These two people may come in with you on the day of your procedure. The designated visitor must also wear a mask. Once your procedure and the immediate recovery period is completed, a nurse in the recovery area will contact your designated visitor to inform them of your room number or to otherwise review other pertinent information regarding your care. Social distancing practices are to be adhered to in waiting areas and the cafeteria. The patient was contacted in person. He verbalized understanding of all instructions does not  need reinforcement.

## 2022-08-30 LAB
BACTERIA SPEC CULT: NORMAL
BACTERIA SPEC CULT: NORMAL
SERVICE CMNT-IMP: NORMAL
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
## 2022-09-07 ENCOUNTER — HOSPITAL ENCOUNTER (OUTPATIENT)
Age: 67
Discharge: HOME HEALTH | End: 2022-09-09
Attending: ORTHOPAEDIC SURGERY | Admitting: ORTHOPAEDIC SURGERY
Payer: MEDICARE

## 2022-09-07 ENCOUNTER — ANESTHESIA EVENT (OUTPATIENT)
Dept: SURGERY | Age: 67
End: 2022-09-07
Payer: MEDICARE

## 2022-09-07 ENCOUNTER — ANESTHESIA (OUTPATIENT)
Dept: SURGERY | Age: 67
End: 2022-09-07
Payer: MEDICARE

## 2022-09-07 DIAGNOSIS — M17.12 OSTEOARTHRITIS OF LEFT KNEE, UNSPECIFIED OSTEOARTHRITIS TYPE: Primary | ICD-10-CM

## 2022-09-07 LAB
GLUCOSE BLD STRIP.AUTO-MCNC: 79 MG/DL (ref 65–117)
SERVICE CMNT-IMP: NORMAL

## 2022-09-07 PROCEDURE — 77010033678 HC OXYGEN DAILY

## 2022-09-07 PROCEDURE — 76010000172 HC OR TIME 2.5 TO 3 HR INTENSV-TIER 1: Performed by: ORTHOPAEDIC SURGERY

## 2022-09-07 PROCEDURE — 77030003601 HC NDL NRV BLK BBMI -A

## 2022-09-07 PROCEDURE — 77030040922 HC BLNKT HYPOTHRM STRY -A

## 2022-09-07 PROCEDURE — 76060000036 HC ANESTHESIA 2.5 TO 3 HR: Performed by: ORTHOPAEDIC SURGERY

## 2022-09-07 PROCEDURE — 74011000258 HC RX REV CODE- 258: Performed by: ORTHOPAEDIC SURGERY

## 2022-09-07 PROCEDURE — 77030006835 HC BLD SAW SAG STRY -B: Performed by: ORTHOPAEDIC SURGERY

## 2022-09-07 PROCEDURE — 77030042556 HC PNCL CAUT -B: Performed by: ORTHOPAEDIC SURGERY

## 2022-09-07 PROCEDURE — 27447 TOTAL KNEE ARTHROPLASTY: CPT | Performed by: ORTHOPAEDIC SURGERY

## 2022-09-07 PROCEDURE — 94760 N-INVAS EAR/PLS OXIMETRY 1: CPT

## 2022-09-07 PROCEDURE — 77030020268 HC MISC GENERAL SUPPLY: Performed by: ORTHOPAEDIC SURGERY

## 2022-09-07 PROCEDURE — 74011000250 HC RX REV CODE- 250: Performed by: PHYSICIAN ASSISTANT

## 2022-09-07 PROCEDURE — C1776 JOINT DEVICE (IMPLANTABLE): HCPCS | Performed by: ORTHOPAEDIC SURGERY

## 2022-09-07 PROCEDURE — 74011000258 HC RX REV CODE- 258: Performed by: NURSE ANESTHETIST, CERTIFIED REGISTERED

## 2022-09-07 PROCEDURE — 77030039825 HC MSK NSL PAP SUPERNO2VA VYRM -B: Performed by: ANESTHESIOLOGY

## 2022-09-07 PROCEDURE — 74011000250 HC RX REV CODE- 250: Performed by: ORTHOPAEDIC SURGERY

## 2022-09-07 PROCEDURE — 74011250636 HC RX REV CODE- 250/636: Performed by: ORTHOPAEDIC SURGERY

## 2022-09-07 PROCEDURE — C9290 INJ, BUPIVACAINE LIPOSOME: HCPCS | Performed by: ORTHOPAEDIC SURGERY

## 2022-09-07 PROCEDURE — 77030006822 HC BLD SAW SAG BRSM -B: Performed by: ORTHOPAEDIC SURGERY

## 2022-09-07 PROCEDURE — 74011250636 HC RX REV CODE- 250/636: Performed by: ANESTHESIOLOGY

## 2022-09-07 PROCEDURE — 76210000006 HC OR PH I REC 0.5 TO 1 HR: Performed by: ORTHOPAEDIC SURGERY

## 2022-09-07 PROCEDURE — 77030005513 HC CATH URETH FOL11 MDII -B: Performed by: ORTHOPAEDIC SURGERY

## 2022-09-07 PROCEDURE — 74011250636 HC RX REV CODE- 250/636: Performed by: NURSE ANESTHETIST, CERTIFIED REGISTERED

## 2022-09-07 PROCEDURE — 2709999900 HC NON-CHARGEABLE SUPPLY

## 2022-09-07 PROCEDURE — 82962 GLUCOSE BLOOD TEST: CPT

## 2022-09-07 PROCEDURE — 20985 CPTR-ASST DIR MS PX: CPT | Performed by: ORTHOPAEDIC SURGERY

## 2022-09-07 PROCEDURE — 77030010507 HC ADH SKN DERMBND J&J -B: Performed by: ORTHOPAEDIC SURGERY

## 2022-09-07 PROCEDURE — 77030028907 HC WRP KNEE WO BGS SOLM -B

## 2022-09-07 PROCEDURE — 77030007866 HC KT SPN ANES BBMI -B: Performed by: ANESTHESIOLOGY

## 2022-09-07 PROCEDURE — C1713 ANCHOR/SCREW BN/BN,TIS/BN: HCPCS | Performed by: ORTHOPAEDIC SURGERY

## 2022-09-07 PROCEDURE — 77030035236 HC SUT PDS STRATFX BARB J&J -B: Performed by: ORTHOPAEDIC SURGERY

## 2022-09-07 PROCEDURE — 77030020274 HC MISC IMPL ORTHOPEDIC: Performed by: ORTHOPAEDIC SURGERY

## 2022-09-07 PROCEDURE — 74011000250 HC RX REV CODE- 250: Performed by: NURSE ANESTHETIST, CERTIFIED REGISTERED

## 2022-09-07 PROCEDURE — 74011250636 HC RX REV CODE- 250/636: Performed by: PHYSICIAN ASSISTANT

## 2022-09-07 PROCEDURE — 74011250637 HC RX REV CODE- 250/637: Performed by: PHYSICIAN ASSISTANT

## 2022-09-07 PROCEDURE — 77030031139 HC SUT VCRL2 J&J -A: Performed by: ORTHOPAEDIC SURGERY

## 2022-09-07 PROCEDURE — 77030010783 HC BOWL MX BN CEM J&J -B: Performed by: ORTHOPAEDIC SURGERY

## 2022-09-07 PROCEDURE — 2709999900 HC NON-CHARGEABLE SUPPLY: Performed by: ORTHOPAEDIC SURGERY

## 2022-09-07 PROCEDURE — 77030002933 HC SUT MCRYL J&J -A: Performed by: ORTHOPAEDIC SURGERY

## 2022-09-07 PROCEDURE — 27447 TOTAL KNEE ARTHROPLASTY: CPT | Performed by: PHYSICIAN ASSISTANT

## 2022-09-07 DEVICE — ATTUNE KNEE SYSTEM FEMORAL POROCOAT CRUCIATE RETAINING SIZE 8 LEFT CEMENTLESS
Type: IMPLANTABLE DEVICE | Site: KNEE | Status: FUNCTIONAL
Brand: ATTUNE

## 2022-09-07 DEVICE — ATTUNE KNEE SYSTEM TIBIAL BASE AFFIXIUM FIXED BEARING SIZE 8
Type: IMPLANTABLE DEVICE | Site: KNEE | Status: FUNCTIONAL
Brand: ATTUNE AFFIXIUM

## 2022-09-07 DEVICE — SMARTSET GHV GENTAMICIN HIGH VISCOSITY BONE CEMENT 40G
Type: IMPLANTABLE DEVICE | Site: KNEE | Status: FUNCTIONAL
Brand: SMARTSET

## 2022-09-07 DEVICE — KNEE K2 TOT HEMI ADV CMTLS IMPL CAPPED SYNTHES: Type: IMPLANTABLE DEVICE | Status: FUNCTIONAL

## 2022-09-07 DEVICE — ATTUNE PATELLA MEDIALIZED DOME 41MM CEMENTED AOX
Type: IMPLANTABLE DEVICE | Site: KNEE | Status: FUNCTIONAL
Brand: ATTUNE

## 2022-09-07 DEVICE — ATTUNE KNEE SYSTEM TIBIAL INSERT FIXED BEARING MEDIAL STABILIZED LEFT AOX 8, 5MM
Type: IMPLANTABLE DEVICE | Site: KNEE | Status: FUNCTIONAL
Brand: ATTUNE

## 2022-09-07 RX ORDER — SODIUM CHLORIDE 0.9 % (FLUSH) 0.9 %
5-40 SYRINGE (ML) INJECTION EVERY 8 HOURS
Status: DISCONTINUED | OUTPATIENT
Start: 2022-09-07 | End: 2022-09-09 | Stop reason: HOSPADM

## 2022-09-07 RX ORDER — ONDANSETRON 2 MG/ML
INJECTION INTRAMUSCULAR; INTRAVENOUS AS NEEDED
Status: DISCONTINUED | OUTPATIENT
Start: 2022-09-07 | End: 2022-09-07 | Stop reason: HOSPADM

## 2022-09-07 RX ORDER — SODIUM CHLORIDE 9 MG/ML
125 INJECTION, SOLUTION INTRAVENOUS CONTINUOUS
Status: DISPENSED | OUTPATIENT
Start: 2022-09-07 | End: 2022-09-08

## 2022-09-07 RX ORDER — ROPIVACAINE HYDROCHLORIDE 5 MG/ML
INJECTION, SOLUTION EPIDURAL; INFILTRATION; PERINEURAL
Status: COMPLETED | OUTPATIENT
Start: 2022-09-07 | End: 2022-09-07

## 2022-09-07 RX ORDER — ACETAMINOPHEN 500 MG
1000 TABLET ORAL ONCE
Status: COMPLETED | OUTPATIENT
Start: 2022-09-07 | End: 2022-09-07

## 2022-09-07 RX ORDER — MIDAZOLAM HYDROCHLORIDE 1 MG/ML
1 INJECTION, SOLUTION INTRAMUSCULAR; INTRAVENOUS AS NEEDED
Status: DISCONTINUED | OUTPATIENT
Start: 2022-09-07 | End: 2022-09-07 | Stop reason: HOSPADM

## 2022-09-07 RX ORDER — SODIUM CHLORIDE 0.9 % (FLUSH) 0.9 %
5-40 SYRINGE (ML) INJECTION EVERY 8 HOURS
Status: DISCONTINUED | OUTPATIENT
Start: 2022-09-07 | End: 2022-09-07 | Stop reason: HOSPADM

## 2022-09-07 RX ORDER — FENTANYL CITRATE 50 UG/ML
50 INJECTION, SOLUTION INTRAMUSCULAR; INTRAVENOUS AS NEEDED
Status: DISCONTINUED | OUTPATIENT
Start: 2022-09-07 | End: 2022-09-07 | Stop reason: HOSPADM

## 2022-09-07 RX ORDER — SODIUM CHLORIDE 0.9 % (FLUSH) 0.9 %
5-40 SYRINGE (ML) INJECTION AS NEEDED
Status: DISCONTINUED | OUTPATIENT
Start: 2022-09-07 | End: 2022-09-07 | Stop reason: HOSPADM

## 2022-09-07 RX ORDER — MORPHINE SULFATE 2 MG/ML
2 INJECTION, SOLUTION INTRAMUSCULAR; INTRAVENOUS
Status: DISCONTINUED | OUTPATIENT
Start: 2022-09-07 | End: 2022-09-07 | Stop reason: HOSPADM

## 2022-09-07 RX ORDER — POLYETHYLENE GLYCOL 3350 17 G/17G
17 POWDER, FOR SOLUTION ORAL DAILY
Status: DISCONTINUED | OUTPATIENT
Start: 2022-09-08 | End: 2022-09-09 | Stop reason: HOSPADM

## 2022-09-07 RX ORDER — PROPOFOL 10 MG/ML
INJECTION, EMULSION INTRAVENOUS
Status: DISCONTINUED | OUTPATIENT
Start: 2022-09-07 | End: 2022-09-07 | Stop reason: HOSPADM

## 2022-09-07 RX ORDER — FAMOTIDINE 20 MG/1
20 TABLET, FILM COATED ORAL 2 TIMES DAILY
Status: DISCONTINUED | OUTPATIENT
Start: 2022-09-07 | End: 2022-09-09 | Stop reason: HOSPADM

## 2022-09-07 RX ORDER — ROPIVACAINE HYDROCHLORIDE 5 MG/ML
30 INJECTION, SOLUTION EPIDURAL; INFILTRATION; PERINEURAL AS NEEDED
Status: DISCONTINUED | OUTPATIENT
Start: 2022-09-07 | End: 2022-09-07 | Stop reason: HOSPADM

## 2022-09-07 RX ORDER — PROPOFOL 10 MG/ML
INJECTION, EMULSION INTRAVENOUS AS NEEDED
Status: DISCONTINUED | OUTPATIENT
Start: 2022-09-07 | End: 2022-09-07 | Stop reason: HOSPADM

## 2022-09-07 RX ORDER — SODIUM CHLORIDE 0.9 % (FLUSH) 0.9 %
5-40 SYRINGE (ML) INJECTION AS NEEDED
Status: DISCONTINUED | OUTPATIENT
Start: 2022-09-07 | End: 2022-09-09 | Stop reason: HOSPADM

## 2022-09-07 RX ORDER — OXYCODONE HYDROCHLORIDE 5 MG/1
5 TABLET ORAL
Status: DISCONTINUED | OUTPATIENT
Start: 2022-09-07 | End: 2022-09-08

## 2022-09-07 RX ORDER — ONDANSETRON 2 MG/ML
4 INJECTION INTRAMUSCULAR; INTRAVENOUS
Status: ACTIVE | OUTPATIENT
Start: 2022-09-07 | End: 2022-09-08

## 2022-09-07 RX ORDER — HYDROXYZINE HYDROCHLORIDE 10 MG/1
10 TABLET, FILM COATED ORAL
Status: DISCONTINUED | OUTPATIENT
Start: 2022-09-07 | End: 2022-09-09 | Stop reason: HOSPADM

## 2022-09-07 RX ORDER — ONDANSETRON 2 MG/ML
4 INJECTION INTRAMUSCULAR; INTRAVENOUS AS NEEDED
Status: DISCONTINUED | OUTPATIENT
Start: 2022-09-07 | End: 2022-09-07 | Stop reason: HOSPADM

## 2022-09-07 RX ORDER — TRAMADOL HYDROCHLORIDE 50 MG/1
50 TABLET ORAL
Status: DISCONTINUED | OUTPATIENT
Start: 2022-09-07 | End: 2022-09-08

## 2022-09-07 RX ORDER — FACIAL-BODY WIPES
10 EACH TOPICAL DAILY PRN
Status: DISCONTINUED | OUTPATIENT
Start: 2022-09-08 | End: 2022-09-09 | Stop reason: HOSPADM

## 2022-09-07 RX ORDER — FENTANYL CITRATE 50 UG/ML
25 INJECTION, SOLUTION INTRAMUSCULAR; INTRAVENOUS
Status: COMPLETED | OUTPATIENT
Start: 2022-09-07 | End: 2022-09-07

## 2022-09-07 RX ORDER — SODIUM CHLORIDE 9 MG/ML
25 INJECTION, SOLUTION INTRAVENOUS CONTINUOUS
Status: DISCONTINUED | OUTPATIENT
Start: 2022-09-07 | End: 2022-09-07 | Stop reason: HOSPADM

## 2022-09-07 RX ORDER — SODIUM CHLORIDE, SODIUM LACTATE, POTASSIUM CHLORIDE, CALCIUM CHLORIDE 600; 310; 30; 20 MG/100ML; MG/100ML; MG/100ML; MG/100ML
100 INJECTION, SOLUTION INTRAVENOUS CONTINUOUS
Status: DISCONTINUED | OUTPATIENT
Start: 2022-09-07 | End: 2022-09-07 | Stop reason: HOSPADM

## 2022-09-07 RX ORDER — DEXAMETHASONE SODIUM PHOSPHATE 4 MG/ML
INJECTION, SOLUTION INTRA-ARTICULAR; INTRALESIONAL; INTRAMUSCULAR; INTRAVENOUS; SOFT TISSUE AS NEEDED
Status: DISCONTINUED | OUTPATIENT
Start: 2022-09-07 | End: 2022-09-07 | Stop reason: HOSPADM

## 2022-09-07 RX ORDER — LOSARTAN POTASSIUM 50 MG/1
100 TABLET ORAL DAILY
Status: DISCONTINUED | OUTPATIENT
Start: 2022-09-08 | End: 2022-09-09 | Stop reason: HOSPADM

## 2022-09-07 RX ORDER — AMOXICILLIN 250 MG
1 CAPSULE ORAL 2 TIMES DAILY
Status: DISCONTINUED | OUTPATIENT
Start: 2022-09-07 | End: 2022-09-09 | Stop reason: HOSPADM

## 2022-09-07 RX ORDER — ASPIRIN 81 MG/1
81 TABLET ORAL 2 TIMES DAILY
Status: DISCONTINUED | OUTPATIENT
Start: 2022-09-07 | End: 2022-09-09 | Stop reason: HOSPADM

## 2022-09-07 RX ORDER — DIPHENHYDRAMINE HYDROCHLORIDE 50 MG/ML
12.5 INJECTION, SOLUTION INTRAMUSCULAR; INTRAVENOUS AS NEEDED
Status: DISCONTINUED | OUTPATIENT
Start: 2022-09-07 | End: 2022-09-07 | Stop reason: HOSPADM

## 2022-09-07 RX ORDER — ACETAMINOPHEN 325 MG/1
650 TABLET ORAL ONCE
Status: DISCONTINUED | OUTPATIENT
Start: 2022-09-07 | End: 2022-09-07 | Stop reason: HOSPADM

## 2022-09-07 RX ORDER — KETOROLAC TROMETHAMINE 30 MG/ML
15 INJECTION, SOLUTION INTRAMUSCULAR; INTRAVENOUS EVERY 6 HOURS
Status: COMPLETED | OUTPATIENT
Start: 2022-09-08 | End: 2022-09-08

## 2022-09-07 RX ORDER — SODIUM CHLORIDE 9 MG/ML
INJECTION, SOLUTION INTRAVENOUS
Status: DISCONTINUED | OUTPATIENT
Start: 2022-09-07 | End: 2022-09-07 | Stop reason: HOSPADM

## 2022-09-07 RX ORDER — LIDOCAINE HYDROCHLORIDE 20 MG/ML
INJECTION, SOLUTION EPIDURAL; INFILTRATION; INTRACAUDAL; PERINEURAL AS NEEDED
Status: DISCONTINUED | OUTPATIENT
Start: 2022-09-07 | End: 2022-09-07 | Stop reason: HOSPADM

## 2022-09-07 RX ORDER — CELECOXIB 200 MG/1
200 CAPSULE ORAL ONCE
Status: COMPLETED | OUTPATIENT
Start: 2022-09-07 | End: 2022-09-07

## 2022-09-07 RX ORDER — MIDAZOLAM HYDROCHLORIDE 1 MG/ML
0.5 INJECTION, SOLUTION INTRAMUSCULAR; INTRAVENOUS
Status: DISCONTINUED | OUTPATIENT
Start: 2022-09-07 | End: 2022-09-07 | Stop reason: HOSPADM

## 2022-09-07 RX ORDER — NALOXONE HYDROCHLORIDE 0.4 MG/ML
0.4 INJECTION, SOLUTION INTRAMUSCULAR; INTRAVENOUS; SUBCUTANEOUS AS NEEDED
Status: DISCONTINUED | OUTPATIENT
Start: 2022-09-07 | End: 2022-09-09 | Stop reason: HOSPADM

## 2022-09-07 RX ORDER — ACETAMINOPHEN 325 MG/1
650 TABLET ORAL EVERY 6 HOURS
Status: DISCONTINUED | OUTPATIENT
Start: 2022-09-08 | End: 2022-09-09 | Stop reason: HOSPADM

## 2022-09-07 RX ORDER — HYDROMORPHONE HYDROCHLORIDE 1 MG/ML
0.5 INJECTION, SOLUTION INTRAMUSCULAR; INTRAVENOUS; SUBCUTANEOUS
Status: DISCONTINUED | OUTPATIENT
Start: 2022-09-07 | End: 2022-09-07 | Stop reason: HOSPADM

## 2022-09-07 RX ORDER — PREGABALIN 75 MG/1
75 CAPSULE ORAL ONCE
Status: COMPLETED | OUTPATIENT
Start: 2022-09-07 | End: 2022-09-07

## 2022-09-07 RX ORDER — DEXMEDETOMIDINE HYDROCHLORIDE 100 UG/ML
INJECTION, SOLUTION INTRAVENOUS AS NEEDED
Status: DISCONTINUED | OUTPATIENT
Start: 2022-09-07 | End: 2022-09-07 | Stop reason: HOSPADM

## 2022-09-07 RX ORDER — HYDROMORPHONE HYDROCHLORIDE 1 MG/ML
0.5 INJECTION, SOLUTION INTRAMUSCULAR; INTRAVENOUS; SUBCUTANEOUS
Status: DISCONTINUED | OUTPATIENT
Start: 2022-09-07 | End: 2022-09-08

## 2022-09-07 RX ORDER — LIDOCAINE HYDROCHLORIDE 10 MG/ML
0.1 INJECTION, SOLUTION EPIDURAL; INFILTRATION; INTRACAUDAL; PERINEURAL AS NEEDED
Status: DISCONTINUED | OUTPATIENT
Start: 2022-09-07 | End: 2022-09-07 | Stop reason: HOSPADM

## 2022-09-07 RX ADMIN — SODIUM CHLORIDE, PRESERVATIVE FREE 10 ML: 5 INJECTION INTRAVENOUS at 22:20

## 2022-09-07 RX ADMIN — CEFAZOLIN 2 G: 1 INJECTION, POWDER, FOR SOLUTION INTRAMUSCULAR; INTRAVENOUS at 23:24

## 2022-09-07 RX ADMIN — PROPOFOL 75 MCG/KG/MIN: 10 INJECTION, EMULSION INTRAVENOUS at 16:03

## 2022-09-07 RX ADMIN — ONDANSETRON HYDROCHLORIDE 4 MG: 2 INJECTION, SOLUTION INTRAMUSCULAR; INTRAVENOUS at 16:07

## 2022-09-07 RX ADMIN — SODIUM CHLORIDE, POTASSIUM CHLORIDE, SODIUM LACTATE AND CALCIUM CHLORIDE 100 ML/HR: 600; 310; 30; 20 INJECTION, SOLUTION INTRAVENOUS at 13:45

## 2022-09-07 RX ADMIN — FENTANYL CITRATE 25 MCG: 50 INJECTION, SOLUTION INTRAMUSCULAR; INTRAVENOUS at 19:03

## 2022-09-07 RX ADMIN — FAMOTIDINE 20 MG: 20 TABLET, FILM COATED ORAL at 20:49

## 2022-09-07 RX ADMIN — PROPOFOL 50 MG: 10 INJECTION, EMULSION INTRAVENOUS at 15:50

## 2022-09-07 RX ADMIN — DOCUSATE SODIUM 50MG AND SENNOSIDES 8.6MG 1 TABLET: 8.6; 5 TABLET, FILM COATED ORAL at 20:49

## 2022-09-07 RX ADMIN — DEXAMETHASONE SODIUM PHOSPHATE 4 MG: 4 INJECTION, SOLUTION INTRAMUSCULAR; INTRAVENOUS at 16:07

## 2022-09-07 RX ADMIN — PHENYLEPHRINE HYDROCHLORIDE 40 MCG/MIN: 10 INJECTION INTRAVENOUS at 16:00

## 2022-09-07 RX ADMIN — FENTANYL CITRATE 25 MCG: 50 INJECTION, SOLUTION INTRAMUSCULAR; INTRAVENOUS at 18:52

## 2022-09-07 RX ADMIN — CELECOXIB 200 MG: 200 CAPSULE ORAL at 13:45

## 2022-09-07 RX ADMIN — WATER 2 G: 1 INJECTION INTRAMUSCULAR; INTRAVENOUS; SUBCUTANEOUS at 16:09

## 2022-09-07 RX ADMIN — DEXMEDETOMIDINE HYDROCHLORIDE 4 MCG: 100 INJECTION, SOLUTION, CONCENTRATE INTRAVENOUS at 16:35

## 2022-09-07 RX ADMIN — MEPIVACAINE HYDROCHLORIDE 52.5 MG: 15 INJECTION, SOLUTION EPIDURAL; INFILTRATION at 15:59

## 2022-09-07 RX ADMIN — FENTANYL CITRATE 50 MCG: 50 INJECTION, SOLUTION INTRAMUSCULAR; INTRAVENOUS at 14:25

## 2022-09-07 RX ADMIN — FENTANYL CITRATE 25 MCG: 50 INJECTION, SOLUTION INTRAMUSCULAR; INTRAVENOUS at 18:58

## 2022-09-07 RX ADMIN — LIDOCAINE HYDROCHLORIDE 50 MG: 20 INJECTION, SOLUTION EPIDURAL; INFILTRATION; INTRACAUDAL; PERINEURAL at 15:50

## 2022-09-07 RX ADMIN — PREGABALIN 75 MG: 75 CAPSULE ORAL at 13:46

## 2022-09-07 RX ADMIN — ROPIVACAINE HYDROCHLORIDE 30 ML: 5 INJECTION, SOLUTION EPIDURAL; INFILTRATION; PERINEURAL at 14:46

## 2022-09-07 RX ADMIN — DEXMEDETOMIDINE HYDROCHLORIDE 4 MCG: 100 INJECTION, SOLUTION, CONCENTRATE INTRAVENOUS at 16:49

## 2022-09-07 RX ADMIN — PROPOFOL 50 MCG/KG/MIN: 10 INJECTION, EMULSION INTRAVENOUS at 15:50

## 2022-09-07 RX ADMIN — ACETAMINOPHEN 650 MG: 325 TABLET, FILM COATED ORAL at 23:24

## 2022-09-07 RX ADMIN — OXYCODONE 5 MG: 5 TABLET ORAL at 22:41

## 2022-09-07 RX ADMIN — KETOROLAC TROMETHAMINE 15 MG: 30 INJECTION, SOLUTION INTRAMUSCULAR; INTRAVENOUS at 23:25

## 2022-09-07 RX ADMIN — TRANEXAMIC ACID 1 G: 100 INJECTION, SOLUTION INTRAVENOUS at 16:15

## 2022-09-07 RX ADMIN — MIDAZOLAM 2 MG: 1 INJECTION INTRAMUSCULAR; INTRAVENOUS at 14:25

## 2022-09-07 RX ADMIN — DEXMEDETOMIDINE HYDROCHLORIDE 4 MCG: 100 INJECTION, SOLUTION, CONCENTRATE INTRAVENOUS at 17:07

## 2022-09-07 RX ADMIN — ASPIRIN 81 MG: 81 TABLET, COATED ORAL at 20:49

## 2022-09-07 RX ADMIN — ACETAMINOPHEN 1000 MG: 500 TABLET, FILM COATED ORAL at 13:45

## 2022-09-07 RX ADMIN — SODIUM CHLORIDE 125 ML/HR: 9 INJECTION, SOLUTION INTRAVENOUS at 20:20

## 2022-09-07 RX ADMIN — FENTANYL CITRATE 25 MCG: 50 INJECTION, SOLUTION INTRAMUSCULAR; INTRAVENOUS at 19:32

## 2022-09-07 RX ADMIN — HYDROMORPHONE HYDROCHLORIDE 0.5 MG: 1 INJECTION, SOLUTION INTRAMUSCULAR; INTRAVENOUS; SUBCUTANEOUS at 20:48

## 2022-09-07 RX ADMIN — ONDANSETRON 4 MG: 2 INJECTION INTRAMUSCULAR; INTRAVENOUS at 18:36

## 2022-09-07 RX ADMIN — SODIUM CHLORIDE: 900 INJECTION, SOLUTION INTRAVENOUS at 17:34

## 2022-09-07 NOTE — H&P
Date of Surgery Update:  Alex Valdes was seen and examined. History and physical has been reviewed. The patient has been examined. There have been no significant clinical changes since the completion of the originally dated History and Physical.  Patient identified by surgeon; surgical site was confirmed by patient and surgeon.     Signed By: Umberto Rawls MD     September 7, 2022 2:17 PM

## 2022-09-07 NOTE — DISCHARGE INSTRUCTIONS
Discharge Instructions Knee Replacement  Dr. Enriqueta Browne  Patient Name  Lucy Hunt  Date of procedure  9/7/2022    Procedure  Procedure(s):  LEFT TOTAL KNEE ARTHROPLASTY - VELYS  Surgeon  Surgeon(s) and Role:     * Pedrito García MD - Primary  Date of discharge: [unfilled]  PCP: @PCP@    Follow up care  Follow up visit with Dr. Enriqueta Browne in 4 weeks. Call [phone] Ellene Schwab) to make an appointment. If Home Health has been arranged for you, they will call you to arrange dates/times for visits. Call them if you do not hear from them within 24 hours after you go home. Activity at home  Take a short walk every hour; except at night when sleeping. Do your Home Exercise Program 3 times every day. After exercising lie down and elevate your leg on pillows for 15-30 minutes to decrease swelling. Refer to your patient notebook for more information. Bathing and caring for your incision  You may take a shower with your waterproof dressing on your knee. The waterproof dressing is to stay on your knee for 7 days. On the 7th day have someone gently peel the dressing off by lifting the edge and stretching it to break the seal.  You may then leave your incision open to air unless you see drainage from your knee. Preventing blood clots  Take Aspirin 81mg twice each day for one month (30 days) following surgery. Call Dr. Enriqueta Browne for signs of a blood clot in your leg: calf pain, tenderness, redness, swelling of lower leg   Preventing lung congestion  Use your incentive spirometer 4 times a day; do 10 repetitions each time  Remember to keep the small blue ball between the two arrows when taking a slow, deep breath   Pain Management  Get up and walk a short distance to relieve pain and stiffness. Place ice wrap on your knee except when you are walking. The gel ice packs should be changed about every 4 hours. Elevate your leg on pillows for 15-30 minutes.    Pain Medications  Take Tylenol 650mg (take two 325mg tablets) every 6 hours for the next 4 weeks. Take Meloxicam (Mobic) every day as prescribed to decrease swelling and inflammation. Do not take Meloxicam if you have had stomach ulcers. Take Famotidine (Pepcid) 20mg twice a day to prevent an upset stomach (if taking Aspirin and/or Meloxicam). If needed, take Tramadol (narcotic pain pill) every 6 hours as prescribed. If Tramadol has not relieved your pain within 1 hour, take Oxycodone 5mg (narcotic pain pill). Take Oxycodone only if needed and stop once your pain is tolerable. Take all medications with a small amount of food. As your pain decreases, take the narcotics less often or take ½ of a pill. Call Dr. Antwan Stratton if you have side effects from your narcotic pain medication: itching, drowsiness, dizziness, upset stomach, dry mouth, constipation or if you medication is not relieving your pain. Diet after surgery  You may resume your normal diet. Include vegetables, fruit, whole grains, lean meats, and low-fat dairy products. Eat food high in fiber. Drink plenty of fluids, including 8 cups of water daily. Take a stool softener (Senokot-s or Colace) to prevent constipation. If constipation occurs you may take a laxative (Milk of Magnesia, Dulcolax tablets). Avoid after surgery  Do not take any over-the-counter medication for pain except Tylenol  Do not take more than 3000mg (3 Grams) of Tylenol in 24 hours  Do not drink alcoholic beverages  Do not smoke  Do not drive until seen for follow up appointment  Do not place frozen gel pack directly on your skin. It can cause frostbite. Do not take a tub bath, swim or get in a hot tub for 8 weeks  Prevention of falls and safety at home  Set up an area where you can rest comfortably leaving space around furniture to allow you to walk with your walker. Keep stairs, hallways and bathrooms well lit; especially at night. Arrange for care for your pets  Keep your home free of clutter.    Call Dr. Antwan Stratton at [phone] for:  Pain that is not relieved by pain medication, ice and activity  Side effects of medications  Increased/spread of bruising  Warning signs of infection:  persistent fever greater than 100 degrees  shaking or chills  increased redness, tenderness, swelling or drainage from incision  increased pain during activity or rest  Warning signs of a blood clot in your leg:  increased pain in your calf  tenderness or redness  increased swelling or knee, calf, ankle or foot    Call [phone] after 5pm or on a weekend.  The on call physician will return your phone call  Call your Primary Care Doctor for:   Concerns about your medical conditions such as diabetes, high blood pressure, asthma, congestive heart failure  Blood sugars greater than 180  Persistent headache or dizziness  Coughing or congestion  Constipation or diarrhea  Burning when you go to the bathroom  Abnormal heart rate (fast or  slow)      Call 911 and go to the nearest hospital for:   Sudden increased shortness of breath  Sudden onset of chest pain  Difficulty breathing  Localized chest pain with coughing or taking a deep breath

## 2022-09-07 NOTE — ANESTHESIA PREPROCEDURE EVALUATION
Relevant Problems   No relevant active problems       Anesthetic History   No history of anesthetic complications            Review of Systems / Medical History  Patient summary reviewed, nursing notes reviewed and pertinent labs reviewed    Pulmonary  Within defined limits                 Neuro/Psych   Within defined limits           Cardiovascular  Within defined limits  Hypertension              Exercise tolerance: >4 METS     GI/Hepatic/Renal  Within defined limits         Hiatal hernia     Endo/Other  Within defined limits      Arthritis     Other Findings              Physical Exam    Airway  Mallampati: II  TM Distance: > 6 cm  Neck ROM: normal range of motion   Mouth opening: Normal     Cardiovascular  Regular rate and rhythm,  S1 and S2 normal,  no murmur, click, rub, or gallop             Dental  No notable dental hx       Pulmonary  Breath sounds clear to auscultation               Abdominal  GI exam deferred       Other Findings            Anesthetic Plan    ASA: 2  Anesthesia type: spinal      Post-op pain plan if not by surgeon: peripheral nerve block single    Induction: Intravenous  Anesthetic plan and risks discussed with: Patient

## 2022-09-07 NOTE — ANESTHESIA PROCEDURE NOTES
Peripheral Block    Start time: 9/7/2022 2:36 PM  End time: 9/7/2022 2:46 PM  Performed by: Monse Horton MD  Authorized by: Monse Horton MD       Pre-procedure: Indications: at surgeon's request and post-op pain management    Preanesthetic Checklist: patient identified, risks and benefits discussed, site marked, timeout performed, anesthesia consent given and patient being monitored      Block Type:   Block Type:   Adductor canal  Laterality:  Left  Monitoring:  Standard ASA monitoring, responsive to questions, oxygen, continuous pulse ox, frequent vital sign checks and heart rate  Injection Technique:  Single shot  Procedures: ultrasound guided    Patient Position: supine  Prep: chlorhexidine    Location:  Mid thigh  Needle Type:  Stimuplex  Needle Gauge:  22 G  Needle Localization:  Ultrasound guidance  Medication Injected:  Ropivacaine (PF) (NAROPIN)(0.5%) 5 mg/mL injection - Peripheral Nerve Block   30 mL - 9/7/2022 2:46:00 PM  Med Admin Time: 9/7/2022 2:46 PM    Assessment:  Number of attempts:  1  Injection Assessment:  Incremental injection every 5 mL, local visualized surrounding nerve on ultrasound, negative aspiration for blood, no intravascular symptoms, no paresthesia and ultrasound image on chart  Patient tolerance:  Patient tolerated the procedure well with no immediate complications

## 2022-09-07 NOTE — ROUTINE PROCESS
Patient: Sudheer Peck MRN: [id number]  SSN: [id number]   YOB: 1955  Age: 78 y.o. Sex: male     Patient is status post Procedure(s):  LEFT TOTAL KNEE ARTHROPLASTY - VELYS.     Surgeon(s) and Role:     * Presley Puga MD - Primary    Local/Dose/Irrigation:  see MAR                  Peripheral IV 09/07/22 Left Antecubital (Active)   Site Assessment Clean, dry, & intact 09/07/22 1343   Phlebitis Assessment 0 09/07/22 1343   Infiltration Assessment 0 09/07/22 1343   Dressing Status Clean, dry, & intact 09/07/22 1343   Dressing Type Transparent 09/07/22 1343   Hub Color/Line Status Pink 09/07/22 1343                           Dressing/Packing:  Incision 09/07/22 Knee Left-Dressing/Treatment: Other (Comment) (dermabond, aquacel, cast padding, ace; bandaid on navigation port site) (09/07/22 2490)

## 2022-09-07 NOTE — OP NOTES
Name: Anthony Miller  MRN:  [id number]  : 1955  Age:  78 y.o. Surgery Date: 2022      OPERATIVE REPORT - LEFT TOTAL KNEE REPLACEMENT    PREOPERATIVE DIAGNOSIS: Osteoarthritis, left knee. POSTOPERATIVE DIAGNOSIS: Osteoarthritis, left knee. PROCEDURE PERFORMED: Computer assisted LEFT total knee arthroplasty [employer]    SURGEON: Ayanna Alvarado MD    FIRST ASSISTANT:  Lyubov Buenrostro PA-C    ANESTHESIA: Spinal    PRE-OP ANTIBIOTIC: Ancef 2g    COMPLICATIONS: None. ESTIMATED BLOOD LOSS: 100 mL. SPECIMENS REMOVED: None. COMPONENTS IMPLANTED:   Implant Name Type Inv. Item Serial No.  Lot No. LRB No. Used Action   CEMENT BNE 40GM FULL DOSE PMMA W/ GENT HI VISC RADPQ LNG - SN/A  CEMENT BNE 40GM FULL DOSE PMMA W/ GENT HI VISC RADPQ LNG N/A [employer] [facility] [facility] [id number] Left 1 Implanted   COMPONENT PAT [id number] POLYETH DOME FRANCIS MEDIALIZED ATTUNE - SN/A  COMPONENT PAT [id number] POLYETH DOME FRANCIS MEDIALIZED ATTUNE N/A [employer] [facility] [id number] Left 1 Implanted   COMPONENT FEM SZ 8 LT KNEE PORCOAT CRUC RET CEMENTLESS SYS - SN/A  COMPONENT FEM SZ 8 LT KNEE PORCOAT CRUC RET CEMENTLESS SYS N/A [employer] [facility] [id number] Left 1 Implanted   BEARING TIB FIX 8 KNEE BASE POROUS ATTUNE AFFIXIUM - SN/A  BEARING TIB FIX 8 KNEE BASE POROUS ATTUNE AFFIXIUM N/A [employer] [facility] [id number] Left 1 Implanted   INSERT TIB FIX BEAR 8 5 MM LT MEDL KNEE STBL AOX ATTUNE - SN/A  INSERT TIB FIX BEAR 8 5 MM LT MEDL KNEE STBL AOX ATTUNE N/A [employer] [facility] [id number] Left 1 Implanted       INDICATIONS: The patient is an 78 yrs male with progressive debilitating left knee pain due to severe osteoarthritis. Symptoms have progressed despite comprehensive conservative treatment and they presents for left total knee replacement. Risks, benefits, alternatives of the procedure were reviewed in detail and the patient desired to proceed.  Risks including bleeding, infection, damage to surrounding structures, blood clots, pulmonary embolism, and death were discussed. The patient understood understands the increased risk for perioperative medical complications due to their medical comorbidities. DESCRIPTION OF PROCEDURE:DESCRIPTION OF PROCEDURE: Epidural/spinal anesthesia was initiated. Two grams of cefazolin were administered within 30 minutes of incision. The left lower extremity was prepped and draped in the usual sterile fashion. The skin was covered with Ioban occlusive dressing. A tourniquet was not used. A midline skin incision was made with a 10 blade and small flaps were elevated. A MIDVASTUS arthrotomy was made to the knee. I released the ACL and menisci and performed a limited medial release. I then obtained all anatomic landmarks using the [facility] system. The tibia was subluxed and I carried out a tibial resection with approximately 2-3 mm from the low side . The meniscal remnants were removed. I then placed the tensor  and took the knee through a range of motion. I utlized the balance curve to modify our femoral cuts. Anterior, posterior, and chamfer cuts were carried out using the [employer] robot. I then prepared the femur for the planned size and drilled lug holes. The tibial was then sized and correct rotation was identified using the medial 1/3 of the tibial tubercle as a landmark. The tibia was prepared using a drill followed by a keel punch. Trials were placed. The patella was sized using a caliper and an appropriate resection  was performed. Lug holes were drilled and a trial was fitted. The knee tracked well with all trial implants. The trials were then removed. Bony surfaces were drilled, lavaged, and dried. All components were cemented. Excess cement was removed. Polyethylene component was placed. After the cement had fully cured, the knee was ranged and  irrigated copiously with pulsatile lavage.      All surrounding soft tissues were infiltrated with local anesthetic. The arthrotomy  was closed with running quill suture and 0 vicryl suture. Skin and subcutaneous tissues were irrigated and closed in standard fashion with 2-0 vicryl and 3-0 monocryl. An aquacel dressing was placed. The patient underwent straight catheterization at the end of the case. Shaun Mane was critical for key portions of the case including retractor management, wound closure, and dressing application. There was no one else available to perform these specific duties. There were no complications. The procedure was a robotic assisted  LEFT TOTAL KNEE REPLACEMENT. No specimens were obtained/sent. The patient was transferred to the recovery room in stable condition.       Rain Keller MD

## 2022-09-07 NOTE — ANESTHESIA PROCEDURE NOTES
Spinal Block    Start time: 9/7/2022 3:50 PM  End time: 9/7/2022 3:59 PM  Performed by: Ann Rob CRNA  Authorized by: Ashley Liriano MD     Pre-procedure:   Indications: primary anesthetic  Preanesthetic Checklist: patient identified, risks and benefits discussed, anesthesia consent, site marked, patient being monitored, timeout performed and fire risk safety assessment completed and verbalized      Spinal Block:   Patient Position:  Seated  Prep Region:  Lumbar  Prep: Betadine      Location:  L4-5  Technique:  Single shot      Med Admin Time: 9/7/2022 3:59 PM    Needle:   Needle Type:  Pencil-tip  Needle Gauge:  24 G  Attempts:  1      Events: CSF confirmed, no blood with aspiration and no paresthesia        Assessment:  Insertion:  Uncomplicated  Patient tolerance:  Patient tolerated the procedure well with no immediate complications

## 2022-09-07 NOTE — PERIOP NOTES
Attempted to contact patient's friend to update on surgery and patient's well being but was unsuccessful.

## 2022-09-07 NOTE — PERIOP NOTES
TRANSFER - OUT REPORT:    Verbal report given to Dain Angela RN (name) on Marilee Waldron  being transferred to [address] [facility] (unit) for routine post - op       Report consisted of patients Situation, Background, Assessment and   Recommendations(SBAR). Information from the following report(s) SBAR, OR Summary, Procedure Summary, MAR, and Recent Results was reviewed with the receiving nurse. Lines:   Peripheral IV 09/07/22 Left Antecubital (Active)   Site Assessment Clean, dry, & intact 09/07/22 1825   Phlebitis Assessment 0 09/07/22 1825   Infiltration Assessment 0 09/07/22 1825   Dressing Status Clean, dry, & intact 09/07/22 1825   Dressing Type Transparent 09/07/22 1825   Hub Color/Line Status Infusing 09/07/22 1825   Action Taken Open ports on tubing capped 09/07/22 1825   Alcohol Cap Used Yes 09/07/22 1825        Opportunity for questions and clarification was provided.

## 2022-09-08 LAB
ANION GAP SERPL CALC-SCNC: 6 MMOL/L (ref 5–15)
BUN SERPL-MCNC: 18 MG/DL (ref 6–20)
BUN/CREAT SERPL: 17 (ref 12–20)
CALCIUM SERPL-MCNC: 8.1 MG/DL (ref 8.5–10.1)
CHLORIDE SERPL-SCNC: 111 MMOL/L (ref 97–108)
CO2 SERPL-SCNC: 23 MMOL/L (ref 21–32)
CREAT SERPL-MCNC: 1.03 MG/DL (ref 0.7–1.3)
GLUCOSE SERPL-MCNC: 102 MG/DL (ref 65–100)
HGB BLD-MCNC: 13.6 G/DL (ref 12.1–17)
POTASSIUM SERPL-SCNC: 4.5 MMOL/L (ref 3.5–5.1)
SODIUM SERPL-SCNC: 140 MMOL/L (ref 136–145)

## 2022-09-08 PROCEDURE — 74011000250 HC RX REV CODE- 250: Performed by: PHYSICIAN ASSISTANT

## 2022-09-08 PROCEDURE — 74011250636 HC RX REV CODE- 250/636: Performed by: PHYSICIAN ASSISTANT

## 2022-09-08 PROCEDURE — 36415 COLL VENOUS BLD VENIPUNCTURE: CPT

## 2022-09-08 PROCEDURE — 77030028907 HC WRP KNEE WO BGS SOLM -B

## 2022-09-08 PROCEDURE — 77030027138 HC INCENT SPIROMETER -A

## 2022-09-08 PROCEDURE — 77030041034 HC KT HIP PATT -B

## 2022-09-08 PROCEDURE — 80048 BASIC METABOLIC PNL TOTAL CA: CPT

## 2022-09-08 PROCEDURE — 97165 OT EVAL LOW COMPLEX 30 MIN: CPT

## 2022-09-08 PROCEDURE — 97535 SELF CARE MNGMENT TRAINING: CPT

## 2022-09-08 PROCEDURE — 85018 HEMOGLOBIN: CPT

## 2022-09-08 PROCEDURE — 97161 PT EVAL LOW COMPLEX 20 MIN: CPT

## 2022-09-08 PROCEDURE — 74011250637 HC RX REV CODE- 250/637

## 2022-09-08 PROCEDURE — 74011250637 HC RX REV CODE- 250/637: Performed by: PHYSICIAN ASSISTANT

## 2022-09-08 PROCEDURE — 97116 GAIT TRAINING THERAPY: CPT

## 2022-09-08 PROCEDURE — 2709999900 HC NON-CHARGEABLE SUPPLY

## 2022-09-08 RX ORDER — NALOXONE HYDROCHLORIDE 4 MG/.1ML
SPRAY NASAL
Qty: 1 EACH | Refills: 0 | Status: SHIPPED | OUTPATIENT
Start: 2022-09-08 | End: 2022-10-03 | Stop reason: ALTCHOICE

## 2022-09-08 RX ORDER — OXYCODONE HYDROCHLORIDE 5 MG/1
5 TABLET ORAL
Qty: 42 TABLET | Refills: 0 | Status: SHIPPED | OUTPATIENT
Start: 2022-09-08 | End: 2022-09-18

## 2022-09-08 RX ORDER — TRAMADOL HYDROCHLORIDE 50 MG/1
50 TABLET ORAL
Status: DISCONTINUED | OUTPATIENT
Start: 2022-09-08 | End: 2022-09-09 | Stop reason: HOSPADM

## 2022-09-08 RX ORDER — TRAMADOL HYDROCHLORIDE 50 MG/1
50 TABLET ORAL
Qty: 42 TABLET | Refills: 0 | Status: SHIPPED | OUTPATIENT
Start: 2022-09-08 | End: 2022-09-23

## 2022-09-08 RX ORDER — OXYCODONE HYDROCHLORIDE 5 MG/1
10 TABLET ORAL
Status: DISCONTINUED | OUTPATIENT
Start: 2022-09-08 | End: 2022-09-09 | Stop reason: HOSPADM

## 2022-09-08 RX ORDER — FAMOTIDINE 20 MG/1
20 TABLET, FILM COATED ORAL 2 TIMES DAILY
Qty: 60 TABLET | Refills: 0 | Status: SHIPPED | OUTPATIENT
Start: 2022-09-08

## 2022-09-08 RX ORDER — OXYCODONE HYDROCHLORIDE 5 MG/1
5 TABLET ORAL ONCE
Status: COMPLETED | OUTPATIENT
Start: 2022-09-08 | End: 2022-09-08

## 2022-09-08 RX ORDER — MELOXICAM 7.5 MG/1
7.5 TABLET ORAL DAILY
Qty: 30 TABLET | Refills: 1 | Status: SHIPPED | OUTPATIENT
Start: 2022-09-08

## 2022-09-08 RX ORDER — OXYCODONE HYDROCHLORIDE 5 MG/1
5 TABLET ORAL
Status: DISCONTINUED | OUTPATIENT
Start: 2022-09-08 | End: 2022-09-09 | Stop reason: HOSPADM

## 2022-09-08 RX ORDER — GUAIFENESIN 100 MG/5ML
81 LIQUID (ML) ORAL 2 TIMES DAILY
Qty: 60 TABLET | Refills: 0 | Status: SHIPPED | OUTPATIENT
Start: 2022-09-08

## 2022-09-08 RX ADMIN — OXYCODONE 5 MG: 5 TABLET ORAL at 01:43

## 2022-09-08 RX ADMIN — ACETAMINOPHEN 650 MG: 325 TABLET, FILM COATED ORAL at 11:46

## 2022-09-08 RX ADMIN — HYDROMORPHONE HYDROCHLORIDE 0.5 MG: 1 INJECTION, SOLUTION INTRAMUSCULAR; INTRAVENOUS; SUBCUTANEOUS at 00:55

## 2022-09-08 RX ADMIN — FAMOTIDINE 20 MG: 20 TABLET, FILM COATED ORAL at 09:16

## 2022-09-08 RX ADMIN — HYDROMORPHONE HYDROCHLORIDE 0.5 MG: 1 INJECTION, SOLUTION INTRAMUSCULAR; INTRAVENOUS; SUBCUTANEOUS at 04:46

## 2022-09-08 RX ADMIN — OXYCODONE 5 MG: 5 TABLET ORAL at 10:53

## 2022-09-08 RX ADMIN — ASPIRIN 81 MG: 81 TABLET, COATED ORAL at 09:16

## 2022-09-08 RX ADMIN — OXYCODONE 10 MG: 5 TABLET ORAL at 20:54

## 2022-09-08 RX ADMIN — DOCUSATE SODIUM 50MG AND SENNOSIDES 8.6MG 1 TABLET: 8.6; 5 TABLET, FILM COATED ORAL at 18:07

## 2022-09-08 RX ADMIN — KETOROLAC TROMETHAMINE 15 MG: 30 INJECTION, SOLUTION INTRAMUSCULAR; INTRAVENOUS at 11:46

## 2022-09-08 RX ADMIN — OXYCODONE 5 MG: 5 TABLET ORAL at 07:02

## 2022-09-08 RX ADMIN — KETOROLAC TROMETHAMINE 15 MG: 30 INJECTION, SOLUTION INTRAMUSCULAR; INTRAVENOUS at 18:07

## 2022-09-08 RX ADMIN — DOCUSATE SODIUM 50MG AND SENNOSIDES 8.6MG 1 TABLET: 8.6; 5 TABLET, FILM COATED ORAL at 09:16

## 2022-09-08 RX ADMIN — ASPIRIN 81 MG: 81 TABLET, COATED ORAL at 18:07

## 2022-09-08 RX ADMIN — OXYCODONE 5 MG: 5 TABLET ORAL at 11:46

## 2022-09-08 RX ADMIN — SODIUM CHLORIDE 125 ML/HR: 9 INJECTION, SOLUTION INTRAVENOUS at 04:44

## 2022-09-08 RX ADMIN — POLYETHYLENE GLYCOL 3350 17 G: 17 POWDER, FOR SOLUTION ORAL at 09:16

## 2022-09-08 RX ADMIN — TRAMADOL HYDROCHLORIDE 50 MG: 50 TABLET ORAL at 09:19

## 2022-09-08 RX ADMIN — ACETAMINOPHEN 650 MG: 325 TABLET, FILM COATED ORAL at 18:07

## 2022-09-08 RX ADMIN — LOSARTAN POTASSIUM 100 MG: 50 TABLET, FILM COATED ORAL at 09:16

## 2022-09-08 RX ADMIN — ACETAMINOPHEN 650 MG: 325 TABLET, FILM COATED ORAL at 06:04

## 2022-09-08 RX ADMIN — SODIUM CHLORIDE, PRESERVATIVE FREE 10 ML: 5 INJECTION INTRAVENOUS at 14:00

## 2022-09-08 RX ADMIN — CEFAZOLIN 2 G: 1 INJECTION, POWDER, FOR SOLUTION INTRAMUSCULAR; INTRAVENOUS at 07:02

## 2022-09-08 RX ADMIN — OXYCODONE 10 MG: 5 TABLET ORAL at 15:55

## 2022-09-08 RX ADMIN — KETOROLAC TROMETHAMINE 15 MG: 30 INJECTION, SOLUTION INTRAMUSCULAR; INTRAVENOUS at 06:05

## 2022-09-08 RX ADMIN — FAMOTIDINE 20 MG: 20 TABLET, FILM COATED ORAL at 18:07
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
## 2022-09-08 NOTE — PROGRESS NOTES
Ortho NP Note    POD# 1  s/p LEFT TOTAL KNEE ARTHROPLASTY - VELYS   Pt seen in room    Pt resting in bed. Reports pain is currently 12/10, recently received oxycodone. Per patient, oxycodone does improve pain to left knee but feels that he \"got behind\" after working with PT, sitting in chair for nearly 2 hrs, then seeing OT. Pain along incision knee and into thigh. Tolerates oxycodone well, denies side effects. Reports using IV dilaudid overnight d/t persistent pain. Denies N/V. No CP, no SOB. VSS Afebrile. Visit Vitals  /85 (BP 1 Location: Right upper arm, BP Patient Position: Sitting)   Pulse 96   Temp 98.1 °F (36.7 °C)   Resp 16   Ht 6' 1\" (1.854 m)   Wt 108.3 kg (238 lb 12.1 oz)   SpO2 95%   BMI 31.50 kg/m²       Voiding status: spontaneous void   Output (mL)  Urine Voided: 150 ml (09/08/22 0058)  Last Bowel Movement Date: 09/07/22 (09/08/22 0910)  Straight Cath  Straight Cath: Nurse performed cath (09/07/22 1820)  Number of Attempts: 1 (09/07/22 1820)  Catheter Size: 16 FR (09/07/22 1820)      Labs    Lab Results   Component Value Date/Time    HGB 13.6 09/08/2022 04:58 AM      Lab Results   Component Value Date/Time    INR 1.0 08/29/2022 08:16 AM      Lab Results   Component Value Date/Time    Sodium 140 09/08/2022 04:58 AM    Potassium 4.5 09/08/2022 04:58 AM    Chloride 111 (H) 09/08/2022 04:58 AM    CO2 23 09/08/2022 04:58 AM    Glucose 102 (H) 09/08/2022 04:58 AM    BUN 18 09/08/2022 04:58 AM    Creatinine 1.03 09/08/2022 04:58 AM    Calcium 8.1 (L) 09/08/2022 04:58 AM     Recent Glucose Results:   Lab Results   Component Value Date/Time     (H) 09/08/2022 04:58 AM    GLUCPOC 79 09/07/2022 01:41 PM           Body mass index is 31.5 kg/m². : A BMI > 30 is classified as obesity and > 40 is classified as morbid obesity. Aquacel dressing c.d.i  Cryotherapy in place over incision  Calves soft and supple; No pain with passive stretch  Bilateral LEs warm, dry. 2+ DP pulses. Sensation and motor intact - PF/DF/EHL intact 5/5  Foot Pumps for mechanical DVT proph while in bed     PLAN:  1) PT: BID WBAT  2) Anticoagulation:  Aspirin 81 mg PO BID for DVT Prophylaxis. Encouraged early mobilization, bed exercises, and SCD use. 3) Pain - Multimodal approach including cryotherapy, scheduled Tylenol & Toradol with  PRN  oxycodone, tramadol . Added 10 mg Oxycodone dose as patient requiring IV meds overnight. Goal for PO pain control for d/c.   4) GI Prophylaxis: Pepcid  5) Post op care: Continue OBR, encouraged IS. Follow up in 3 weeks with Dr Fabiola Hightower. Aquacel to remain in place x 7 days unless integrity is lost.   6) Readiness for discharge:     [x] Vital Signs stable    [x] + Voiding    [x] Wound intact, drainage minimal    [x] Tolerating PO intake     [] Cleared by PT (OT if applicable)     [] Stair training completed (if applicable)    [] Independent / Contact Guard Assist (household distance)     [] Bed mobility     [] Car transfers     [] ADLs    [] Adequate pain control on oral medication alone     Discharge pending PT clearance, goal for home with [facility]. Of note lives alone.      Ivania Betancourt NP  Available via [employer]

## 2022-09-08 NOTE — PROGRESS NOTES
Medicare Outpatient Observation Notice (MOON) provided to patient/representative with verbal explanation of the notice. Time allotted for questions regarding the notice. Patient /representative provided a completed copy of the MOON notice. Copy placed on bedside chart.   Tera Grace Care Management Assistant
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
## 2022-09-08 NOTE — PROGRESS NOTES
Bedside and Verbal shift change report given to Mj Galarza RN (oncoming nurse) by Doni River RN (offgoing nurse). Report included the following information SBAR and MAR.

## 2022-09-08 NOTE — PROGRESS NOTES
Bedside shift change report given to [facility] [address] (oncoming nurse) by Audrey Watters RN (offgoing nurse). Report included the following information SBAR, Kardex, Intake/Output, and MAR.

## 2022-09-08 NOTE — PROGRESS NOTES
Problem: Mobility Impaired (Adult and Pediatric)  Goal: *Acute Goals and Plan of Care (Insert Text)  Description: FUNCTIONAL STATUS PRIOR TO ADMISSION: Patient was independent and active without use of DME.    HOME SUPPORT PRIOR TO ADMISSION: The patient lived alone with no local support. Physical Therapy Goals  Initiated 9/8/2022    1. Patient will move from supine to sit and sit to supine  in bed with modified independence within 4 days. 2. Patient will perform sit to stand with modified independence within 4 days. 3. Patient will ambulate with modified independence for 120 feet with the least restrictive device within 4 days. 4. Patient will ascend/descend 5 stairs with 2 handrail(s) with modified independence within 4 days. 5. Patient will perform home exercise program per protocol with independence within 4 days. 6. Patient will demonstrate AROM 0-90 degrees in operative joint within 4 days. 9/8/2022 1331 by Sarahi Clifford PT  Outcome: Progressing Towards Goal   PHYSICAL THERAPY TREATMENT  Patient: Deepali Hoover (43 y.o. male)  Date: 9/8/2022  Diagnosis: Primary osteoarthritis of left knee [M17.12]  Osteoarthritis of left knee, unspecified osteoarthritis type [M17.12] <principal problem not specified>  Procedure(s) (LRB):  LEFT TOTAL KNEE ARTHROPLASTY - VELYS (Left) 1 Day Post-Op  Precautions: Fall, WBAT  Chart, physical therapy assessment, plan of care and goals were reviewed. ASSESSMENT  Patient continues with skilled PT services and is progressing towards goals. Patient is received supine in bed. He demonstrates the ability to transition supine to sit with HOB elevated and use of bed rails. Patient performs sit<>stand with CGA. He demonstrates the ability to ambulate significantly increased distance with use of RW demonstrating decreased step length and decreased L LE weight bearing. Patient demonstrates good stability and balance.  He ascends and descends 2 steps with bilateral hand rails and VC for sequencing. Patient demonstrates good balance on stairs. He will need increased stair training prior to D/C. Patient will also benefit from increased bed mobility. Current Level of Function Impacting Discharge (mobility/balance): CGA gait and transfers with RW    Other factors to consider for discharge: medical stability, increased gait and stair training          PLAN :  Patient continues to benefit from skilled intervention to address the above impairments. Continue treatment per established plan of care. to address goals. Recommendation for discharge: (in order for the patient to meet his/her long term goals)  Physical therapy at least 2 days/week in the home     This discharge recommendation:  Has been made in collaboration with the attending provider and/or case management    IF patient discharges home will need the following DME: rolling walker       SUBJECTIVE:   Patient stated I'm doing alright.     OBJECTIVE DATA SUMMARY:   Critical Behavior:  Neurologic State: Alert  Orientation Level: Oriented X4  Cognition: Appropriate decision making, Appropriate for age attention/concentration, Appropriate safety awareness, Follows commands     Functional Mobility Training:  Bed Mobility:     Supine to Sit: Stand-by assistance;Bed Modified     Scooting: Stand-by assistance        Transfers:  Sit to Stand: Contact guard assistance  Stand to Sit: Contact guard assistance                             Balance:  Sitting: Intact; Without support  Standing: Impaired; With support  Standing - Static: Constant support;Good  Standing - Dynamic : Constant support; Fair  Ambulation/Gait Training:  Distance (ft): 200 Feet (ft)  Assistive Device: Walker, rolling;Gait belt  Ambulation - Level of Assistance: Contact guard assistance        Gait Abnormalities: Decreased step clearance              Speed/Juany: Slow  Step Length: Right shortened;Left shortened                    Stairs:  Number of Stairs Trained: 2  Stairs - Level of Assistance: Contact guard assistance   Rail Use: Both    Therapeutic Exercises:     Pain Rating:      Activity Tolerance:   Good    After treatment patient left in no apparent distress:   Sitting in chair and Call bell within reach    COMMUNICATION/COLLABORATION:   The patients plan of care was discussed with: Registered nurse.      Elida Faust, PT   Time Calculation: 24 mins

## 2022-09-08 NOTE — PROGRESS NOTES
Patient assessed for readiness to ambulate. Additional Blood Pressure/Pulse Data  Pulse 2: 81  BP 2: (!) 152/91  BP 2 Location: Right arm  BP Method 2: Automatic  Patient Position 2: Sitting  Pulse 3: 84  BP 3: 137/88  BP 3 Location: Right arm  BP Method 3: Automatic  Patient Position 3: Standing Vital Signs  Level of Consciousness: Alert (0)  Temp: 97.7 °F (36.5 °C)  Temp Source: Oral  Pulse (Heart Rate): 71  Heart Rate Source: Monitor  Cardiac Rhythm: (P) Sinus Rhythm  Resp Rate: 16  BP: (!) 170/62  MAP (Monitor): 99  MAP (Calculated): 98  BP 1 Location: Right upper arm  BP 1 Method: Automatic  BP Patient Position: Lying  MEWS Score: 1  More BP/Pulse rows needed?: Yes. Patient ambulated with assistance of 2 nurses. Patient ambulated with gait belt and walker. Patient walked to sidestepped to Head of Bed. Patient returned safely to bed.

## 2022-09-08 NOTE — PROGRESS NOTES
Resting comfortably      GEN:  NAD.  AOx3   ABD:  S/NT/ND   LLE:  Dressing C/D/I , 5/5 motor, Calf nttp (Bilat), Sensation rossly intact to light touch throughout, 1+ dp/pt pulses, foot perfused    Patient Vitals for the past 24 hrs:   Temp Pulse Resp BP SpO2   09/08/22 0910 98.1 °F (36.7 °C) 96 16 134/85 95 %   09/08/22 0058 97.7 °F (36.5 °C) 71 16 137/89 97 %   09/07/22 2335 97.6 °F (36.4 °C) 66 17 139/86 99 %   09/07/22 2246 -- 71 -- (!) 170/62 --   09/07/22 2217 97.7 °F (36.5 °C) 71 16 (!) 159/89 98 %   09/07/22 2118 97.4 °F (36.3 °C) 75 14 (!) 149/70 96 %   09/07/22 2021 97.6 °F (36.4 °C) 67 16 (!) 144/91 98 %   09/07/22 2002 97.5 °F (36.4 °C) -- -- -- --   09/07/22 1945 -- 64 13 132/86 97 %   09/07/22 1930 -- 73 14 126/85 95 %   09/07/22 1915 -- 71 17 122/78 96 %   09/07/22 1900 -- 73 13 133/81 95 %   09/07/22 1845 -- 71 20 (!) 131/90 97 %   09/07/22 1840 -- 73 21 (!) 135/92 97 %   09/07/22 1835 -- 78 18 (!) 129/93 97 %   09/07/22 1830 -- 81 23 115/86 95 %   09/07/22 1825 97.4 °F (36.3 °C) 80 21 (!) 138/94 97 %   09/07/22 1448 -- (P) 82 (P) 16 (!) (P) 139/99 (P) 96 %   09/07/22 1312 97.8 °F (36.6 °C) 71 17 (!) 149/104 96 %       Current Facility-Administered Medications   Medication Dose Route Frequency    traMADoL (ULTRAM) tablet 50 mg  50 mg Oral Q6H PRN    oxyCODONE IR (ROXICODONE) tablet 5 mg  5 mg Oral Q3H PRN    oxyCODONE IR (ROXICODONE) tablet 10 mg  10 mg Oral Q3H PRN    losartan (COZAAR) tablet 100 mg  100 mg Oral DAILY    0.9% sodium chloride infusion  125 mL/hr IntraVENous CONTINUOUS    sodium chloride 0.9 % bolus infusion 500 mL  500 mL IntraVENous ONCE PRN    sodium chloride (NS) flush 5-40 mL  5-40 mL IntraVENous Q8H    sodium chloride (NS) flush 5-40 mL  5-40 mL IntraVENous PRN    acetaminophen (TYLENOL) tablet 650 mg  650 mg Oral Q6H    naloxone (NARCAN) injection 0.4 mg  0.4 mg IntraVENous PRN    ondansetron (ZOFRAN) injection 4 mg  4 mg IntraVENous Q4H PRN    hydrOXYzine HCL (ATARAX) tablet 10 mg  10 mg Oral Q8H PRN    famotidine (PEPCID) tablet 20 mg  20 mg Oral BID    senna-docusate (PERICOLACE) 8.6-50 mg per tablet 1 Tablet  1 Tablet Oral BID    polyethylene glycol (MIRALAX) packet 17 g  17 g Oral DAILY    bisacodyL (DULCOLAX) suppository 10 mg  10 mg Rectal DAILY PRN    aspirin delayed-release tablet 81 mg  81 mg Oral BID    ketorolac (TORADOL) injection 15 mg  15 mg IntraVENous Q6H       Lab Results   Component Value Date/Time    HGB 13.6 09/08/2022 04:58 AM    INR 1.0 08/29/2022 08:16 AM       Lab Results   Component Value Date/Time    Sodium 140 09/08/2022 04:58 AM    Potassium 4.5 09/08/2022 04:58 AM    Chloride 111 (H) 09/08/2022 04:58 AM    CO2 23 09/08/2022 04:58 AM    BUN 18 09/08/2022 04:58 AM    Creatinine 1.03 09/08/2022 04:58 AM    Calcium 8.1 (L) 09/08/2022 04:58 AM            78 y.o. male s/p left total knee arthroplasty on 9/7/2022  . Doing well.        ABX: Complete 24 hours perioperative abx  PATHWAY: Straight cath per protocol if needed  DVT Prophylaxis: Aspirin 81 mg enteric coated BID  Weight Bearing: WBAT LLE   Pain Control: Toradol, Tylenol, 15 mg meloxicam to begin evening POD 1 if patient still in hospital, tramadol every 6 hours, oxy 5 mg for breakthrough pain  Disposition: Home today vs tomorrow pending PT clearance, HHPT

## 2022-09-08 NOTE — PROGRESS NOTES
Problem: Falls - Risk of  Goal: *Absence of Falls  Description: Document Tamia Taveras Fall Risk and appropriate interventions in the flowsheet. Outcome: Progressing Towards Goal  Note: Fall Risk Interventions:  Mobility Interventions: Bed/chair exit alarm, Communicate number of staff needed for ambulation/transfer, Patient to call before getting OOB, Utilize walker, cane, or other assistive device, Utilize gait belt for transfers/ambulation         Medication Interventions: Assess postural VS orthostatic hypotension, Bed/chair exit alarm, Patient to call before getting OOB, Teach patient to arise slowly         History of Falls Interventions: Bed/chair exit alarm, Door open when patient unattended, Assess for delayed presentation/identification of injury for 48 hrs (comment for end date), Vital signs minimum Q4HRs X 24 hrs (comment for end date)         Problem: Knee Replacement: Day of Surgery/Unit  Goal: Activity/Safety  Outcome: Progressing Towards Goal  Goal: Nutrition/Diet  Outcome: Progressing Towards Goal  Goal: Medications  Outcome: Progressing Towards Goal  Goal: Respiratory  Outcome: Progressing Towards Goal  Note: Elevated head of bed, incentive spirometer demonstrated and instructed use 10x per hour when awake. Tolerated well,   Goal: *Initiate mobility  Outcome: Progressing Towards Goal  Note: Initiated early ambulation, orthostatics done. Fairly tolerated.    Goal: *Optimal pain control at patient's stated goal  Outcome: Progressing Towards Goal  Goal: *Hemodynamically stable  Outcome: Progressing Towards Goal

## 2022-09-09 VITALS
TEMPERATURE: 97.8 F | DIASTOLIC BLOOD PRESSURE: 77 MMHG | BODY MASS INDEX: 31.64 KG/M2 | HEIGHT: 73 IN | OXYGEN SATURATION: 95 % | SYSTOLIC BLOOD PRESSURE: 124 MMHG | RESPIRATION RATE: 16 BRPM | WEIGHT: 238.76 LBS | HEART RATE: 92 BPM

## 2022-09-09 PROCEDURE — 74011000250 HC RX REV CODE- 250: Performed by: PHYSICIAN ASSISTANT

## 2022-09-09 PROCEDURE — 97535 SELF CARE MNGMENT TRAINING: CPT

## 2022-09-09 PROCEDURE — 97116 GAIT TRAINING THERAPY: CPT | Performed by: PHYSICAL THERAPIST

## 2022-09-09 PROCEDURE — 74011250637 HC RX REV CODE- 250/637

## 2022-09-09 PROCEDURE — 97530 THERAPEUTIC ACTIVITIES: CPT | Performed by: PHYSICAL THERAPIST

## 2022-09-09 PROCEDURE — 74011250637 HC RX REV CODE- 250/637: Performed by: PHYSICIAN ASSISTANT

## 2022-09-09 RX ADMIN — LOSARTAN POTASSIUM 100 MG: 50 TABLET, FILM COATED ORAL at 09:00

## 2022-09-09 RX ADMIN — OXYCODONE 10 MG: 5 TABLET ORAL at 03:41

## 2022-09-09 RX ADMIN — OXYCODONE 10 MG: 5 TABLET ORAL at 06:44

## 2022-09-09 RX ADMIN — ACETAMINOPHEN 650 MG: 325 TABLET, FILM COATED ORAL at 00:24

## 2022-09-09 RX ADMIN — SODIUM CHLORIDE, PRESERVATIVE FREE 10 ML: 5 INJECTION INTRAVENOUS at 00:25

## 2022-09-09 RX ADMIN — ACETAMINOPHEN 650 MG: 325 TABLET, FILM COATED ORAL at 06:44

## 2022-09-09 RX ADMIN — POLYETHYLENE GLYCOL 3350 17 G: 17 POWDER, FOR SOLUTION ORAL at 09:01

## 2022-09-09 RX ADMIN — ASPIRIN 81 MG: 81 TABLET, COATED ORAL at 09:00

## 2022-09-09 RX ADMIN — OXYCODONE 10 MG: 5 TABLET ORAL at 09:28

## 2022-09-09 RX ADMIN — DOCUSATE SODIUM 50MG AND SENNOSIDES 8.6MG 1 TABLET: 8.6; 5 TABLET, FILM COATED ORAL at 09:00

## 2022-09-09 RX ADMIN — FAMOTIDINE 20 MG: 20 TABLET, FILM COATED ORAL at 09:01

## 2022-09-09 RX ADMIN — OXYCODONE 10 MG: 5 TABLET ORAL at 00:23

## 2022-09-09 NOTE — PROGRESS NOTES
Patient seen and cleared OT services. Rec HHOT at [place]. Full note to follow.     Thank you  Fam Christensen OT

## 2022-09-09 NOTE — PROGRESS NOTES
Problem: Falls - Risk of  Goal: *Absence of Falls  Description: Document Jone Nicole Fall Risk and appropriate interventions in the flowsheet.   Outcome: Progressing Towards Goal  Note: Fall Risk Interventions:  Mobility Interventions: Bed/chair exit alarm, Communicate number of staff needed for ambulation/transfer, Patient to call before getting OOB         Medication Interventions: Assess postural VS orthostatic hypotension, Bed/chair exit alarm, Evaluate medications/consider consulting pharmacy, Patient to call before getting OOB    Elimination Interventions: Bed/chair exit alarm, Call light in reach, Patient to call for help with toileting needs    History of Falls Interventions: Bed/chair exit alarm         Problem: Patient Education: Go to Patient Education Activity  Goal: Patient/Family Education  Outcome: Progressing Towards Goal     Problem: Patient Education: Go to Patient Education Activity  Goal: Patient/Family Education  Outcome: Progressing Towards Goal     Problem: Knee Replacement: Day of Surgery/Unit  Goal: Off Pathway (Use only if patient is Off Pathway)  Outcome: Progressing Towards Goal  Goal: Activity/Safety  Outcome: Progressing Towards Goal  Goal: Consults, if ordered  Outcome: Progressing Towards Goal  Goal: Diagnostic Test/Procedures  Outcome: Progressing Towards Goal  Goal: Nutrition/Diet  Outcome: Progressing Towards Goal  Goal: Medications  Outcome: Progressing Towards Goal  Goal: Respiratory  Outcome: Progressing Towards Goal  Goal: Treatments/Interventions/Procedures  Outcome: Progressing Towards Goal  Goal: Psychosocial  Outcome: Progressing Towards Goal  Goal: *Initiate mobility  Outcome: Progressing Towards Goal  Goal: *Optimal pain control at patient's stated goal  Outcome: Progressing Towards Goal  Goal: *Hemodynamically stable  Outcome: Progressing Towards Goal     Problem: Knee Replacement: Post-Op Day 1  Goal: Off Pathway (Use only if patient is Off Pathway)  Outcome: Progressing Towards Goal  Goal: Activity/Safety  Outcome: Progressing Towards Goal  Goal: Diagnostic Test/Procedures  Outcome: Progressing Towards Goal  Goal: Nutrition/Diet  Outcome: Progressing Towards Goal  Goal: Medications  Outcome: Progressing Towards Goal  Goal: Respiratory  Outcome: Progressing Towards Goal  Goal: Treatments/Interventions/Procedures  Outcome: Progressing Towards Goal  Goal: Psychosocial  Outcome: Progressing Towards Goal  Goal: Discharge Planning  Outcome: Progressing Towards Goal  Goal: *Demonstrates progressive activity  Outcome: Progressing Towards Goal  Goal: *Optimal pain control at patient's stated goal  Outcome: Progressing Towards Goal  Goal: *Hemodynamically stable  Outcome: Progressing Towards Goal  Goal: *Discharge plan identified  Outcome: Progressing Towards Goal     Problem: Knee Replacement: Post-Op Day 2  Goal: Off Pathway (Use only if patient is Off Pathway)  Outcome: Progressing Towards Goal  Goal: Activity/Safety  Outcome: Progressing Towards Goal  Goal: Diagnostic Test/Procedures  Outcome: Progressing Towards Goal  Goal: Medications  Outcome: Progressing Towards Goal  Goal: Respiratory  Outcome: Progressing Towards Goal  Goal: Treatments/Interventions/Procedures  Outcome: Progressing Towards Goal  Goal: Psychosocial  Outcome: Progressing Towards Goal  Goal: *Met physical therapy criteria for discharge to the next level of care  Outcome: Progressing Towards Goal  Goal: *Optimal pain control with oral analgesia  Outcome: Progressing Towards Goal  Goal: *Hemodynamically stable  Outcome: Progressing Towards Goal  Goal: *Tolerating diet  Outcome: Progressing Towards Goal  Goal: *Patient verbalizes understanding of discharge instructions  Outcome: Progressing Towards Goal     Problem: Patient Education: Go to Patient Education Activity  Goal: Patient/Family Education  Outcome: Progressing Towards Goal     Problem: Patient Education: Go to Patient Education Activity  Goal: Patient/Family Education  Outcome: Progressing Towards Goal

## 2022-09-09 NOTE — ANESTHESIA POSTPROCEDURE EVALUATION
Procedure(s):  LEFT TOTAL KNEE ARTHROPLASTY - VELYS.    spinal    Anesthesia Post Evaluation        Patient location during evaluation: PACU  Note status: Adequate. Level of consciousness: responsive to verbal stimuli and sleepy but conscious  Pain management: satisfactory to patient  Airway patency: patent  Anesthetic complications: no  Cardiovascular status: acceptable  Respiratory status: acceptable  Hydration status: acceptable  Comments: +Post-Anesthesia Evaluation and Assessment    Patient: Yuli Soliman MRN: [id number]  SSN: [id number]   YOB: 1955  Age: 78 y.o. Sex: male          Cardiovascular Function/Vital Signs    /77 (BP 1 Location: Right upper arm, BP Patient Position: Sitting)   Pulse 92   Temp 36.6 °C (97.8 °F)   Resp 16   Ht 6' 1\" (1.854 m)   Wt 108.3 kg (238 lb 12.1 oz)   SpO2 95%   BMI 31.50 kg/m²     Patient is status post Procedure(s):  LEFT TOTAL KNEE ARTHROPLASTY - VELYS. Nausea/Vomiting: Controlled. Postoperative hydration reviewed and adequate. Pain:  Pain Scale 1: Numeric (0 - 10) (09/09/22 1020)  Pain Intensity 1: 5 (09/09/22 1020)   Managed. Neurological Status:   Neuro (WDL): Within Defined Limits (09/07/22 1845)   At baseline. Mental Status and Level of Consciousness: Arousable. Pulmonary Status:   O2 Device: None (Room air) (09/09/22 0902)   Adequate oxygenation and airway patent. Complications related to anesthesia: None    Post-anesthesia assessment completed. No concerns. I have evaluated the patient and the patient is stable and ready to be discharged from PACU . Signed By: Yong Che MD    9/9/2022        INITIAL Post-op Vital signs:   Vitals Value Taken Time   /86 09/07/22 1945   Temp 36.4 °C (97.5 °F) 09/07/22 2002   Pulse 61 09/07/22 1955   Resp 16 09/1955   SpO2 98 % 09/07/22 1955   Vitals shown include unvalidated device data.

## 2022-09-09 NOTE — PROGRESS NOTES
Problem: Falls - Risk of  Goal: *Absence of Falls  Description: Document Tru Dobbs Fall Risk and appropriate interventions in the flowsheet.   9/9/2022 1239 by Jordin Guthrie RN  Outcome: Resolved/Met  Note: Fall Risk Interventions:  Mobility Interventions: Bed/chair exit alarm, Communicate number of staff needed for ambulation/transfer, Patient to call before getting OOB         Medication Interventions: Assess postural VS orthostatic hypotension, Bed/chair exit alarm, Evaluate medications/consider consulting pharmacy, Patient to call before getting OOB    Elimination Interventions: Bed/chair exit alarm, Call light in reach, Patient to call for help with toileting needs    History of Falls Interventions: Bed/chair exit alarm      9/9/2022 1041 by Jordin Guthrie RN  Outcome: Progressing Towards Goal  Note: Fall Risk Interventions:  Mobility Interventions: Bed/chair exit alarm, Communicate number of staff needed for ambulation/transfer, Patient to call before getting OOB         Medication Interventions: Assess postural VS orthostatic hypotension, Bed/chair exit alarm, Evaluate medications/consider consulting pharmacy, Patient to call before getting OOB    Elimination Interventions: Bed/chair exit alarm, Call light in reach, Patient to call for help with toileting needs    History of Falls Interventions: Bed/chair exit alarm         Problem: Patient Education: Go to Patient Education Activity  Goal: Patient/Family Education  9/9/2022 1239 by Jordin Guthrie RN  Outcome: Resolved/Met  9/9/2022 1041 by Jordin Guthrie RN  Outcome: Progressing Towards Goal     Problem: Patient Education: Go to Patient Education Activity  Goal: Patient/Family Education  9/9/2022 1239 by Jordin Guthrie RN  Outcome: Resolved/Met  9/9/2022 1041 by Jordin Guthrie RN  Outcome: Progressing Towards Goal     Problem: Knee Replacement: Day of Surgery/Unit  Goal: Off Pathway (Use only if patient is Off Pathway)  9/9/2022 1239 by Jordin Guthrie RN  Outcome: Resolved/Met  9/9/2022 1041 by Pradeep Menendez RN  Outcome: Progressing Towards Goal  Goal: Activity/Safety  9/9/2022 1239 by Pradeep Menendez RN  Outcome: Resolved/Met  9/9/2022 1041 by Pradeep Menendez RN  Outcome: Progressing Towards Goal  Goal: Consults, if ordered  9/9/2022 1239 by Pradeep Menendez, RN  Outcome: Resolved/Met  9/9/2022 1041 by Pradeep Menendez RN  Outcome: Progressing Towards Goal  Goal: Diagnostic Test/Procedures  9/9/2022 1239 by Pradeep Menendez RN  Outcome: Resolved/Met  9/9/2022 1041 by Pradeep Menendez RN  Outcome: Progressing Towards Goal  Goal: Nutrition/Diet  9/9/2022 1239 by Pradeep Menendez RN  Outcome: Resolved/Met  9/9/2022 1041 by Pradeep Menendez RN  Outcome: Progressing Towards Goal  Goal: Medications  9/9/2022 1239 by Pradeep Menendez RN  Outcome: Resolved/Met  9/9/2022 1041 by Pradeep Menendez RN  Outcome: Progressing Towards Goal  Goal: Respiratory  9/9/2022 1239 by Pradeep Menendez, RN  Outcome: Resolved/Met  9/9/2022 1041 by Pradeep Menendez RN  Outcome: Progressing Towards Goal  Goal: Treatments/Interventions/Procedures  9/9/2022 1239 by Pradeep Menendez, RN  Outcome: Resolved/Met  9/9/2022 1041 by Pradeep Menendez RN  Outcome: Progressing Towards Goal  Goal: Psychosocial  9/9/2022 1239 by Pradeep Menendez, RN  Outcome: Resolved/Met  9/9/2022 1041 by Pradeep Menendez RN  Outcome: Progressing Towards Goal  Goal: *Initiate mobility  9/9/2022 1239 by Pradeep Menendez, RN  Outcome: Resolved/Met  9/9/2022 1041 by Pradeep Menendez RN  Outcome: Progressing Towards Goal  Goal: *Optimal pain control at patient's stated goal  9/9/2022 1239 by Pradeep Menendez, RN  Outcome: Resolved/Met  9/9/2022 1041 by Pradeep Menendez RN  Outcome: Progressing Towards Goal  Goal: *Hemodynamically stable  9/9/2022 1239 by Pradeep Menendez RN  Outcome: Resolved/Met  9/9/2022 1041 by Pradeep Menendez RN  Outcome: Progressing Towards Goal     Problem: Knee Replacement: Post-Op Day 1  Goal: Off Pathway (Use only if patient is Off Pathway)  9/9/2022 1239 by Brady Doe, RN  Outcome: Resolved/Met  9/9/2022 1041 by Subhasht Brook, RN  Outcome: Progressing Towards Goal  Goal: Activity/Safety  9/9/2022 1239 by Subhasht Brook, RN  Outcome: Resolved/Met  9/9/2022 1041 by Subhasht Brook, RN  Outcome: Progressing Towards Goal  Goal: Diagnostic Test/Procedures  9/9/2022 1239 by Subhasht Brook, RN  Outcome: Resolved/Met  9/9/2022 1041 by Subhasht Brook, RN  Outcome: Progressing Towards Goal  Goal: Nutrition/Diet  9/9/2022 1239 by Subhasht Brook, RN  Outcome: Resolved/Met  9/9/2022 1041 by Subhasht Books, RN  Outcome: Progressing Towards Goal  Goal: Medications  9/9/2022 1239 by Subhasht Brook, RN  Outcome: Resolved/Met  9/9/2022 1041 by Subhasht Brook, RN  Outcome: Progressing Towards Goal  Goal: Respiratory  9/9/2022 1239 by Subhasht Brook, RN  Outcome: Resolved/Met  9/9/2022 1041 by Subhasht Brook, RN  Outcome: Progressing Towards Goal  Goal: Treatments/Interventions/Procedures  9/9/2022 1239 by Subhasht Brook, RN  Outcome: Resolved/Met  9/9/2022 1041 by Subhasht Brook, RN  Outcome: Progressing Towards Goal  Goal: Psychosocial  9/9/2022 1239 by Subhasht Brook, RN  Outcome: Resolved/Met  9/9/2022 1041 by Subhasht Brook, RN  Outcome: Progressing Towards Goal  Goal: Discharge Planning  9/9/2022 1239 by Subhasht Brook, RN  Outcome: Resolved/Met  9/9/2022 1041 by Coit Brook, RN  Outcome: Progressing Towards Goal  Goal: *Demonstrates progressive activity  9/9/2022 1239 by Subhasht Brook, RN  Outcome: Resolved/Met  9/9/2022 1041 by Subhasht Brook, RN  Outcome: Progressing Towards Goal  Goal: *Optimal pain control at patient's stated goal  9/9/2022 1239 by Subhasht Brook, RN  Outcome: Resolved/Met  9/9/2022 1041 by Subhasht Brook, RN  Outcome: Progressing Towards Goal  Goal: *Hemodynamically stable  9/9/2022 1239 by Subhasht Brook, RN  Outcome: Resolved/Met  9/9/2022 1041 by Brady Doe RN  Outcome: Progressing Towards Goal  Goal: *Discharge plan identified  9/9/2022 1239 by Dana Menendez RN  Outcome: Resolved/Met  9/9/2022 1041 by Dana Menendez RN  Outcome: Progressing Towards Goal     Problem: Knee Replacement: Post-Op Day 2  Goal: Off Pathway (Use only if patient is Off Pathway)  9/9/2022 1239 by Dana Menendez RN  Outcome: Resolved/Met  9/9/2022 1041 by Dana Menendez RN  Outcome: Progressing Towards Goal  Goal: Activity/Safety  9/9/2022 1239 by Dana eMnendez RN  Outcome: Resolved/Met  9/9/2022 1041 by Dana Menendez RN  Outcome: Progressing Towards Goal  Goal: Diagnostic Test/Procedures  9/9/2022 1239 by Dana Menendez RN  Outcome: Resolved/Met  9/9/2022 1041 by Dana Menendez RN  Outcome: Progressing Towards Goal  Goal: Medications  9/9/2022 1239 by Dana Menendez RN  Outcome: Resolved/Met  9/9/2022 1041 by Dana Menendez RN  Outcome: Progressing Towards Goal  Goal: Respiratory  9/9/2022 1239 by Dana Menendez RN  Outcome: Resolved/Met  9/9/2022 1041 by Dana Menendez RN  Outcome: Progressing Towards Goal  Goal: Treatments/Interventions/Procedures  9/9/2022 1239 by Dana Menendez RN  Outcome: Resolved/Met  9/9/2022 1041 by Dana Menendez RN  Outcome: Progressing Towards Goal  Goal: Psychosocial  9/9/2022 1239 by Dana Menendez RN  Outcome: Resolved/Met  9/9/2022 1041 by Dana Menendez RN  Outcome: Progressing Towards Goal  Goal: *Met physical therapy criteria for discharge to the next level of care  9/9/2022 1239 by Dana Menendez RN  Outcome: Resolved/Met  9/9/2022 1041 by Dana Menendez RN  Outcome: Progressing Towards Goal  Goal: *Optimal pain control with oral analgesia  9/9/2022 1239 by Dana Menendez RN  Outcome: Resolved/Met  9/9/2022 1041 by Dana Menendez RN  Outcome: Progressing Towards Goal  Goal: *Hemodynamically stable  9/9/2022 1239 by Dana Menendez RN  Outcome: Resolved/Met  9/9/2022 1041 by Dana Menendez RN  Outcome: Progressing Towards Goal  Goal: *Tolerating diet  9/9/2022 1239 by Luis Fernando Gamboa, Diego Shah RN  Outcome: Resolved/Met  9/9/2022 1041 by Sandra Villalta RN  Outcome: Progressing Towards Goal  Goal: *Patient verbalizes understanding of discharge instructions  9/9/2022 1239 by Sandra Villalta RN  Outcome: Resolved/Met  9/9/2022 1041 by Sandra Villalta RN  Outcome: Progressing Towards Goal

## 2022-09-09 NOTE — PROGRESS NOTES
Problem: Self Care Deficits Care Plan (Adult)  Goal: *Acute Goals and Plan of Care (Insert Text)  Description: FUNCTIONAL STATUS PRIOR TO ADMISSION: Patient was independent and active without use of DME. Patient was modified independent for basic and instrumental ADLs. Patient had read the pre-op information and already purchased needed DME to include RW, BSC and shower chair. HOME SUPPORT: The patient lived alone with no local support. Occupational Therapy Goals  Initiated 9/8/2022  1. Patient will perform lower body ADLs with supervision/set-up within 4 day(s). 2.  Patient will perform upper body ADLs standing 5 mins without fatigue or LOB with supervision/set-up within 4 day(s). 3.  Patient will perform all aspects of toileting with supervision/set-up within 4 day(s). 4.  Patient will participate in upper extremity therapeutic exercise/activities with supervision/set-up for 10 minutes within 4 day(s). 5.  Patient will utilize energy conservation techniques during functional activities without cues within 4 day(s). Outcome: Resolved/Met  OCCUPATIONAL THERAPY TREATMENT/DISCHARGE  Patient: Maylin Norton (89 y.o. male)  Date: 9/9/2022  Diagnosis: Primary osteoarthritis of left knee [M17.12]  Osteoarthritis of left knee, unspecified osteoarthritis type [M17.12] <principal problem not specified>  Procedure(s) (LRB):  LEFT TOTAL KNEE ARTHROPLASTY - VELYS (Left) 2 Days Post-Op  Precautions: Fall, WBAT  Chart, occupational therapy assessment, plan of care, and goals were reviewed. ASSESSMENT  Patient continues with skilled OT services and has progressed towards goals. AAOx4, overall is able to teach back and demo AE for LB dressing with set/up to min A. He needed some cues for sequence but overall good carryover. The patient progressed with functional mobility and toileting to SBA. Pt has support initially at home and all DME, recommend HHOT to follow up for increased independence and safety. Current Level of Function (ADLs/self-care): set/up     Other factors to consider for discharge: supportive friends         PLAN :  Rationale for discharge: Goals achieved  Recommend with staff: OOB for meals  Recommendation for discharge: (in order for the patient to meet his/her long term goals)  Occupational therapy at least 2 days/week in the home     This discharge recommendation:  Has been made in collaboration with the attending provider and/or case management    IF patient discharges home will need the following DME:AE: long handled dressing-issued       SUBJECTIVE:   Patient stated I feel ready as I will be. Thanks.     OBJECTIVE DATA SUMMARY:   Cognitive/Behavioral Status:  Neurologic State: Alert  Orientation Level: Oriented X4  Cognition: Appropriate for age attention/concentration; Appropriate decision making  Perception: Appears intact  Perseveration: No perseveration noted  Safety/Judgement: Awareness of environment    Functional Mobility and Transfers for ADLs:    Transfers:  Sit to Stand: Stand-by assistance  Functional Transfers  Bathroom Mobility: Supervision/set up       Balance:  Sitting: Intact  Standing: Intact; With support    ADL Intervention:  Feeding  Feeding Assistance: Set-up    Grooming  Grooming Assistance: Supervision;Modified independent  Washing Hands: Supervision;Modified independent         Lower Body Dressing Assistance  Underpants: Supervision  Shoes with Cloth Laces: Minimum assistance  Adaptive Equipment Used: Reacher;Sock aid;Dressing stick    Toileting  Toileting Assistance: Modified independent  Clothing Management: Modified independent    Cognitive Retraining  Safety/Judgement: Awareness of environment    Therapeutic Exercises:   Progressed with functional mobility into and out of the bathroom for toileting.   Standing grooming, able to reach in and out of base of support    Pain:  None rated, elevated and iced knee    Activity Tolerance:   Good    After treatment patient left in no apparent distress:   Sitting in chair, Call bell within reach, and Caregiver / family present    COMMUNICATION/COLLABORATION:   The patients plan of care was discussed with: Physical therapist, Registered nurse, and Case management.      Junior Rutherford  Time Calculation: 23 mins

## 2022-09-09 NOTE — PROGRESS NOTES
Problem: Mobility Impaired (Adult and Pediatric)  Goal: *Acute Goals and Plan of Care (Insert Text)  Description: FUNCTIONAL STATUS PRIOR TO ADMISSION: Patient was independent and active without use of DME.    HOME SUPPORT PRIOR TO ADMISSION: The patient lived alone with no local support. Physical Therapy Goals  Initiated 9/8/2022    1. Patient will move from supine to sit and sit to supine  in bed with modified independence within 4 days. 2. Patient will perform sit to stand with modified independence within 4 days. 3. Patient will ambulate with modified independence for 120 feet with the least restrictive device within 4 days. 4. Patient will ascend/descend 5 stairs with 2 handrail(s) with modified independence within 4 days. 5. Patient will perform home exercise program per protocol with independence within 4 days. 6. Patient will demonstrate AROM 0-90 degrees in operative joint within 4 days. Outcome: Progressing Towards Goal   PHYSICAL THERAPY TREATMENT  Patient: Lon Gill (73 y.o. male)  Date: 9/9/2022  Diagnosis: Primary osteoarthritis of left knee [M17.12]  Osteoarthritis of left knee, unspecified osteoarthritis type [M17.12] <principal problem not specified>  Procedure(s) (LRB):  LEFT TOTAL KNEE ARTHROPLASTY - VELYS (Left) 2 Days Post-Op  Precautions: Fall, WBAT  Chart, physical therapy assessment, plan of care and goals were reviewed. ASSESSMENT  Patient continues with skilled PT services and is progressing towards goals. Patient overall limited by pain but is otherwise moving well. Overall SBA for transfers and to amb with RW. Up/down 4 stairs with B rail and CGA with no overt LOB or difficulty noted. Patient will have intermittent assist from neighbor but otherwise has meals ready and has moved to first floor of home. Patient has met all goals and is safe to DC home from a mobility standpoint.     Patient is cleared for discharge from PT standpoint:  YES [x]     NO [] Other factors to consider for discharge: at risk for falls, lives alone         PLAN :  Patient continues to benefit from skilled intervention to address the above impairments. Continue treatment per established plan of care. to address goals. Recommendation for discharge: (in order for the patient to meet his/her long term goals)  Physical therapy at least 2 days/week in the home         IF patient discharges home will need the following DME: patient owns DME required for discharge       SUBJECTIVE:   Patient stated I know it will get better.     OBJECTIVE DATA SUMMARY:   Critical Behavior:  Neurologic State: Alert  Orientation Level: Oriented X4  Cognition: Appropriate decision making, Appropriate for age attention/concentration, Appropriate safety awareness, Follows commands  Safety/Judgement: Awareness of environment        Functional Mobility Training:  Bed Mobility:      Not tested              Transfers:  Sit to Stand: Stand-by assistance  Stand to Sit: Stand-by assistance                             Balance:  Sitting: Intact  Standing: Intact; With support  Ambulation/Gait Training:  Distance (ft): 60 Feet (ft)  Assistive Device: Walker, rolling;Gait belt  Ambulation - Level of Assistance: Contact guard assistance        Gait Abnormalities: Decreased step clearance; Antalgic              Speed/Juany: Slow;Pace decreased (<100 feet/min)  Step Length: Right shortened;Left shortened                Stairs:  Number of Stairs Trained: 4  Stairs - Level of Assistance: Contact guard assistance   Rail Use: Both      Pain Rating:  Reports pain but does not rate    Activity Tolerance:   Fair and requires rest breaks    After treatment patient left in no apparent distress:   Sitting in chair and Call bell within reach    COMMUNICATION/COLLABORATION:   The patients plan of care was discussed with: Physical therapist, Occupational therapist, and Registered nurse.      Wing Quigley PT, DPT   Time Calculation: 25 mins

## 2022-09-09 NOTE — PROGRESS NOTES
Bedside and Verbal shift change report given to Deisi Purcell RN (oncoming nurse) by Lillian Chan RN (offgoing nurse). Report included the following information SBAR, Kardex, Procedure Summary, MAR, and Recent Results.

## 2022-09-09 NOTE — PROGRESS NOTES
Transition Of Care: The patient plans to discharge home with [facility] and friend to transport when stable for discharge. Rolling walker ordered with Minna Tapia and delivered by [employer]. RUR: N/A    The patient is from home and lives alone. Orthopedics, PT/OT following. The patient plans to discharge home. Care Management Interventions  PCP Verified by CM: Yes  Palliative Care Criteria Met (RRAT>21 & CHF Dx)?: No  Mode of Transport at Discharge: Other (see comment)  Transition of Care Consult (CM Consult): [address]: No  Reason Outside [place]: Physician referred to specific agency  MyChart Signup: Yes  Discharge Durable Medical Equipment: No  Health Maintenance Reviewed: Yes  Physical Therapy Consult: Yes  Occupational Therapy Consult: Yes  Speech Therapy Consult: No  Confirm Follow Up Transport: Family  The Plan for Transition of Care is Related to the Following Treatment Goals : The patient plans to discharge home with home health. The Patient and/or Patient Representative was Provided with a Choice of Provider and Agrees with the Discharge Plan?: Yes  Freedom of Choice List was Provided with Basic Dialogue that Supports the Patient's Individualized Plan of Care/Goals, Treatment Preferences and Shares the Quality Data Associated with the Providers?: Yes  [place] Resource Information Provided?: No  Discharge Location  Patient Expects to be Discharged to[de-identified] Home with home health     Reason for Admission:  Left Total Knee                     RUR Score:   N/A                  Plan for utilizing home health: The Plan for Transition of Care is related to the following treatment goals: Home Health. The patient is open to any home health agencies. The Patient and/or patient representative Delfino Jacques was provided with a choice of provider and agrees   with the discharge plan.  [x] Yes [] No    Freedom of choice list was provided with basic dialogue that supports the patient's individualized plan of care/goals, treatment preferences and shares the quality data associated with the providers. [x] Yes [] No        PCP: First and Last name:  Georgette Barbour MD     Name of Practice:    Are you a current patient: Yes/No: Y   Approximate date of last visit: Aug, 2022   Can you participate in a virtual visit with your PCP: Y                    Current Advanced Directive/Advance Care Plan: Full Code      Healthcare Decision Maker:   Click here to complete [employer] including selection of the Healthcare Decision Maker Relationship (ie \"Primary\")           Brother: Rasheed Dubois: [phone]                  Transition of Care Plan:        The patient is alert and oriented x4. Confirmed demographics. The patient is independent with ADL's/IADL's, drives a vehicle and uses [facility] pharmacy on [address] [facility]. The patient plans to discharge home with home health and friend to transport when stable for discharge. CM  following for discharge needs.     Qasim Pelletier RN/CRM

## 2022-09-09 NOTE — DISCHARGE SUMMARY
Ortho Discharge Summary    Patient ID:  Maylin Norton  [id number]  male  78 y.o.  1955    Admit date: 9/7/2022    Discharge date: 9/9/2022    Admitting Physician: Ganesh Abel MD     Consulting Physician(s):   Treatment Team: Attending Provider: Berta Pastrana MD; Primary Nurse: Cynthia French, RN; Occupational Therapist: Jessica Draper; Physical Therapist: Rosie Rincon PT, DPT    Date of Surgery:   9/7/2022     Preoperative Diagnosis:  Primary osteoarthritis of left knee [M17.12]    Postoperative Diagnosis:   Primary osteoarthritis of left knee    Procedure(s):   LEFT TOTAL KNEE ARTHROPLASTY - VELYS     Anesthesia Type:   Spinal     Surgeon: Berta Pastrana MD                            HPI:  Pt is a 78 y.o. male who has a history of Primary osteoarthritis of left knee [M17.12]  with pain and limitations of activities of daily living who presents at this time for a left LEFT TOTAL KNEE ARTHROPLASTY - VELYS following the failure of conservative management. PMH:   Past Medical History:   Diagnosis Date    Arthritis     Bunion of left foot 1986    Diverticulitis 2014    Hiatal hernia 2017    Hypertension     Ill-defined condition     diverticulitis    Ill-defined condition     right arm weakness--polio ? Kidney stones        Body mass index is 31.5 kg/m². : A BMI > 30 is classified as obesity and > 40 is classified as morbid obesity. Medications upon admission :   Prior to Admission Medications   Prescriptions Last Dose Informant Patient Reported? Taking?   acetaminophen (TYLENOL) 500 mg tablet 8/24/2022  Yes No   Sig: Take  by mouth every six (6) hours as needed for Pain.   losartan (COZAAR) 100 mg tablet 9/6/2022 at 0900  Yes Yes   Sig: Take 100 mg by mouth in the morning. Facility-Administered Medications: None        Allergies: Allergies   Allergen Reactions    Ace Inhibitors Cough        Hospital Course: The patient underwent surgery.   Complications:  None; patient tolerated the procedure well. Was taken to the PACU in stable condition and then transferred to the ortho floor. On POD 1, patient began evaluation with PT. Pain significantly limiting progress. Oxycodone dose changed from 5 mg to 5-10 mg depending on pain severity with improvement in pain to operative. Patient reevaluated on POD 2 with both physical and occupational therapy clearance for discharge to home with home health. Perioperative Antibiotics:  Ancef     Postoperative Pain Management:  Oxycodone & Tylenol, Tramadol, Mobic    DVT Prophylaxis: Aspirin 81mg PO BID    Postoperative transfusions:    Number of units banked PRBCs =   none     Post Op complications: none    Hemoglobin at discharge:    Lab Results   Component Value Date/Time    HGB 13.6 09/08/2022 04:58 AM    INR 1.0 08/29/2022 08:16 AM       Aquacel dressing remained in place - clean, dry and intact. No significant erythema or swelling. Wound appears to be healing without any evidence of infection. Neurovascular exam found to be within normal limits. Physical Therapy started following surgery and participated in bed mobility, transfers and ambulation. Gait:  Gait  Speed/Juany: Slow, Pace decreased (<100 feet/min)  Step Length: Right shortened, Left shortened  Gait Abnormalities: Decreased step clearance, Antalgic  Ambulation - Level of Assistance: Contact guard assistance  Distance (ft): 60 Feet (ft)  Assistive Device: Walker, rolling, Gait belt  Rail Use: Both  Stairs - Level of Assistance: Contact guard assistance  Number of Stairs Trained: 4                   Discharged to: Home. Condition on Discharge:   stable    Discharge instructions:  - Anticoagulate with Aspirin 81 mg PO BID  - Take pain medications as prescribed  - Resume pre hospital diet      - Discharge activity: activity as tolerated  - Ambulate with assistive device as needed. - Weight bearing status as tolerated  - Wound Care Keep wound clean and dry. See discharge instruction sheet. -DISCHARGE MEDICATION LIST     Current Discharge Medication List        START taking these medications    Details   aspirin 81 mg chewable tablet Take 1 Tablet by mouth two (2) times a day. Qty: 60 Tablet, Refills: 0  Start date: 9/8/2022      famotidine (PEPCID) 20 mg tablet Take 1 Tablet by mouth two (2) times a day. Qty: 60 Tablet, Refills: 0  Start date: 9/8/2022      oxyCODONE IR (ROXICODONE) 5 mg immediate release tablet Take 1 Tablet by mouth every four (4) hours as needed for Pain for up to 10 days. Max Daily Amount: 30 mg.  Qty: 42 Tablet, Refills: 0  Start date: 9/8/2022, End date: 9/18/2022    Comments: Dr. Purnima Dan [id number]  Associated Diagnoses: Osteoarthritis of left knee, unspecified osteoarthritis type      traMADoL (ULTRAM) 50 mg tablet Take 1 Tablet by mouth every six (6) hours as needed for Pain for up to 15 days. Max Daily Amount: 200 mg. Qty: 42 Tablet, Refills: 0  Start date: 9/8/2022, End date: 9/23/2022    Comments: Dr. Purnima Dan [id number]  Associated Diagnoses: Osteoarthritis of left knee, unspecified osteoarthritis type      meloxicam (MOBIC) 7.5 mg tablet Take 1 Tablet by mouth daily. Qty: 30 Tablet, Refills: 1  Start date: 9/8/2022      naloxone [facility]) 4 mg/actuation nasal spray Use 1 spray intranasally, then discard. Repeat with new spray every 2 min as needed for opioid overdose symptoms, alternating nostrils. Qty: 1 Each, Refills: 0  Start date: 9/8/2022           CONTINUE these medications which have NOT CHANGED    Details   losartan (COZAAR) 100 mg tablet Take 100 mg by mouth in the morning.       acetaminophen (TYLENOL) 500 mg tablet Take  by mouth every six (6) hours as needed for Pain.          per medical continuation form      -Follow up in office in 3 weeks with Dr. Yeni Loving      Signed:  Jose A Holman NP  Orthopaedic Nurse Practitioner    9/9/2022  11:16 AM

## 2022-09-09 NOTE — PROGRESS NOTES
Resting comfortably      GEN:  NAD. AOx3   ABD:  S/NT/ND   LLE:  Dressing C/D/I , 5/5 motor, Calf nttp (Bilat), Sensation rossly intact to light touch throughout, 1+ dp/pt pulses, foot perfused    Patient Vitals for the past 24 hrs:   Temp Pulse Resp BP SpO2   09/09/22 0339 98.1 °F (36.7 °C) 85 18 (!) 160/92 95 %   09/08/22 2048 98.2 °F (36.8 °C) 87 18 (!) 122/47 96 %   09/08/22 1434 97.7 °F (36.5 °C) 99 18 138/86 95 %   09/08/22 0910 98.1 °F (36.7 °C) 96 16 134/85 95 %         Current Facility-Administered Medications   Medication Dose Route Frequency    traMADoL (ULTRAM) tablet 50 mg  50 mg Oral Q6H PRN    oxyCODONE IR (ROXICODONE) tablet 5 mg  5 mg Oral Q3H PRN    oxyCODONE IR (ROXICODONE) tablet 10 mg  10 mg Oral Q3H PRN    losartan (COZAAR) tablet 100 mg  100 mg Oral DAILY    sodium chloride (NS) flush 5-40 mL  5-40 mL IntraVENous Q8H    sodium chloride (NS) flush 5-40 mL  5-40 mL IntraVENous PRN    acetaminophen (TYLENOL) tablet 650 mg  650 mg Oral Q6H    naloxone (NARCAN) injection 0.4 mg  0.4 mg IntraVENous PRN    hydrOXYzine HCL (ATARAX) tablet 10 mg  10 mg Oral Q8H PRN    famotidine (PEPCID) tablet 20 mg  20 mg Oral BID    senna-docusate (PERICOLACE) 8.6-50 mg per tablet 1 Tablet  1 Tablet Oral BID    polyethylene glycol (MIRALAX) packet 17 g  17 g Oral DAILY    bisacodyL (DULCOLAX) suppository 10 mg  10 mg Rectal DAILY PRN    aspirin delayed-release tablet 81 mg  81 mg Oral BID       Lab Results   Component Value Date/Time    HGB 13.6 09/08/2022 04:58 AM    INR 1.0 08/29/2022 08:16 AM       Lab Results   Component Value Date/Time    Sodium 140 09/08/2022 04:58 AM    Potassium 4.5 09/08/2022 04:58 AM    Chloride 111 (H) 09/08/2022 04:58 AM    CO2 23 09/08/2022 04:58 AM    BUN 18 09/08/2022 04:58 AM    Creatinine 1.03 09/08/2022 04:58 AM    Calcium 8.1 (L) 09/08/2022 04:58 AM     78 y.o. male s/p left total knee arthroplasty on 9/7/2022  . Doing well.        ABX: Complete 24 hours perioperative abx  PATHWAY: Straight cath per protocol if needed  DVT Prophylaxis: Aspirin 81 mg enteric coated BID  Weight Bearing: WBAT LLE   Pain Control: Tylenol, tramadol every 6 hours, oxy 5 mg for breakthrough pain  Disposition: Home today pending PT clearance, PT

## 2022-09-09 NOTE — PROGRESS NOTES
Ortho NP Note    POD# 2  s/p LEFT TOTAL KNEE ARTHROPLASTY - VELYS   Pt seen in room    Pt seated in chair. Reports pain since walking yesterday has remained elevated, currently rating pain 6/10. Feels oxycodone is effective in pain relief. Aware of plan for therapy reevaluation today with goal for discharge to home with home health. Denies CP, SOB. No N/V. No dizziness/lightheadedness. VSS Afebrile. Visit Vitals  /77 (BP 1 Location: Right upper arm, BP Patient Position: Sitting)   Pulse 92   Temp 97.8 °F (36.6 °C)   Resp 16   Ht 6' 1\" (1.854 m)   Wt 108.3 kg (238 lb 12.1 oz)   SpO2 95%   BMI 31.50 kg/m²       Voiding status: urinal at bedside. Output (mL)  Urine Voided: 150 ml (09/08/22 0058)  Last Bowel Movement Date: 09/07/22 (09/08/22 0910)  Straight Cath  Straight Cath: Nurse performed cath (09/07/22 1820)  Number of Attempts: 1 (09/07/22 1820)  Catheter Size: 16 FR (09/07/22 1820)      Labs    Lab Results   Component Value Date/Time    HGB 13.6 09/08/2022 04:58 AM      Lab Results   Component Value Date/Time    INR 1.0 08/29/2022 08:16 AM      Lab Results   Component Value Date/Time    Sodium 140 09/08/2022 04:58 AM    Potassium 4.5 09/08/2022 04:58 AM    Chloride 111 (H) 09/08/2022 04:58 AM    CO2 23 09/08/2022 04:58 AM    Glucose 102 (H) 09/08/2022 04:58 AM    BUN 18 09/08/2022 04:58 AM    Creatinine 1.03 09/08/2022 04:58 AM    Calcium 8.1 (L) 09/08/2022 04:58 AM     Recent Glucose Results: No results found for: GLU, GLUPOC, GLUCPOC         ASSESSMENT/PLAN: Patient seen following physical therapy clearance, pending OT. I have reviewed progress note and am in agreement with assessment and plan as documented by Sylvia AGUAYO. In preparation for discharged, reviewed the following in room with patient. 1) PT: BID WBAT in hospital.  Therapy to be continued at home with HH--CM involved to arrange, pending  2) Anticoagulation: ASA 81 mg PO BID for DVT Prophylaxis.   Encouraged ongoing mobilization, bed exercises. 3) Pain - Multimodal approach including cryotherapy, scheduled tylenol with PRN oxycodone, tramadol while in hospital.  To be discharged with mobic, tramadol, oxycodone rx--all medications at [address] per patient request.  Discussed precautions for narcotic use: avoid driving, ETOH, other sedating medications. 4) Post op care: Continue OBR, encouraged IS. Follow up in 3-4 weeks with Dr Saul Harrison. Aquacel to remain in place x 7 days unless integrity is lost.   5) Readiness for discharge:      [x] Vital Signs stable    [x] + Voiding    [x] Wound intact, drainage minimal    [x] Tolerating PO intake     [] Cleared by PT (OT if applicable)     [] Stair training completed (if applicable)    [] Independent / Contact Guard Assist (household distance)     [] Bed mobility     [] Car transfers     [] ADLs    [x] Adequate pain control on oral medication alone     Discharge to home with home health today once cleared by OT, [facility] arranged.       Terri Reilly NP  Available via [employer]

## 2022-09-09 NOTE — PROGRESS NOTES
I have reviewed discharge instructions with the patient. The patient verbalized understanding. Patient discharged with all personal belongings and personal walker.
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
## 2022-09-14 ENCOUNTER — HOSPITAL ENCOUNTER (OUTPATIENT)
Dept: ULTRASOUND IMAGING | Age: 67
Discharge: HOME | End: 2022-09-14
Attending: ORTHOPAEDIC SURGERY
Payer: MEDICARE

## 2022-09-14 DIAGNOSIS — Z96.652 STATUS POST LEFT KNEE REPLACEMENT: Primary | ICD-10-CM

## 2022-09-14 DIAGNOSIS — Z96.652 STATUS POST LEFT KNEE REPLACEMENT: ICD-10-CM

## 2022-09-14 PROCEDURE — 93971 EXTREMITY STUDY: CPT

## 2022-10-03 ENCOUNTER — OFFICE VISIT (OUTPATIENT)
Dept: ORTHOPEDIC SURGERY | Age: 67
End: 2022-10-03
Payer: MEDICARE

## 2022-10-03 VITALS — HEIGHT: 73 IN | BODY MASS INDEX: 31.54 KG/M2 | WEIGHT: 238 LBS

## 2022-10-03 DIAGNOSIS — Z98.890 STATUS POST KNEE SURGERY: Primary | ICD-10-CM

## 2022-10-03 PROCEDURE — 99024 POSTOP FOLLOW-UP VISIT: CPT | Performed by: PHYSICIAN ASSISTANT

## 2022-10-03 RX ORDER — OXYCODONE HYDROCHLORIDE 5 MG/1
5 TABLET ORAL
Qty: 20 TABLET | Refills: 0 | Status: SHIPPED | OUTPATIENT
Start: 2022-10-03 | End: 2022-10-10
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
## 2022-10-11 NOTE — PROGRESS NOTES
Tico Machuca (: 1955) is a 78 y.o. male patient here for evaluation of the following chief complaint(s):  Knee Pain       Patient Name: Tico Machuca  Date:10/12/2022  : 1955  [x]  Patient  Verified  Payor: Brandyn Zabala / Plan: [place] MED[place] / Product Type: Medicare /    In time: 10:00  Out time: 10:46  Total Treatment Time (min): 46  Total Timed Codes (min): 40  1:1 Treatment Time ( [address] only): 40  Visit #:       SUBJECTIVE/OBJECTIVE:  HPI    The patient is status post left total knee replacement on 2022. The patient is referred by Nida Peralta MD.   Patient reports overall he has been doing okay. He continues to use the rolling walker for ambulation, mostly for balance. He has tried using the single-point cane at times but is still having some trouble with this. He has been living on the first floor of his house, has not tried many steps other than the steps to get into the home. He has done minimal squatting. Is walking about 800 steps at most at a given time. He is having the most difficulty transitioning from sitting to standing, prolonged standing, stairs, squatting, and sleeping at night. Long-term he would like to be able to walk for longer distances, shop in the grocery store etc. without pain and with adequate balance. Physical Exam  Knee Objective: TKR    Range of motion: on right  Flexion-  115°  Extension-  0°    Range of motion: on left  Flexion- 109°  Extension-  -4°    Strength: on right  Knee ext- 5/5  Knee flex- 5/5  Hip flex/ext- 5/5  Hip ER/IR- NT  Hip abd- NT    Strength: on left  Knee ext- 4/5  Knee flex- 4/5  Hip flex/ext- 4/5  Hip ER/IR- NT  Hip abd- NT    Flexibility:   Hamstring- moderate deficit bilaterally  Hip flexors-  moderate deficit bilaterally  Quadriceps- moderate deficit bilaterally  Gastroc-soleus-  moderate deficit bilaterally    Soft tissue:  patient demonstrates increased swelling surrounding the left knee joint. Pain: Reported a visual analog scale:  0-5/10    Joint mobility assessment: patient is hypomobile into tibiofemoral joint flexion, extension, as well as patellar mobility medial, lateral, superior, and inferior glides    Squatting: Unable without upper extremity support. Cueing required for technique with sit to stand. Vashti Ramey for support. Calf raises: able, pain-free    SLS: NT today    Gait: patient exhibits increased discomfort with ambulation, demonstrates limping with ambulation, favors the left side. Is currently using a rolling walker for ambulation. Lower Extremity Functional Scale  13\80        Therapeutic exercise performed: Sit to stands, terminal knee extensions against band resistance, calf stretch on slant board, hamstring release with lacrosse ball, supine heel prop with weight. 20 minutes. Patient finished with ice to left knee for 6 minutes. An electronic signature was used to authenticate this note. -- Meghan White, PT       ASSESSMENT/PLAN:  Below is the assessment and plan developed based on review of pertinent history, physical exam, labs, studies, and medications. 1. Acute pain of left knee  2. Status post knee surgery      Return in about 1 week (around 10/19/2022). Danie Jackson is a 78 y.o. M presenting status post left total knee replacement, surgical date 9/7/22. He currently has the following physical therapy impairments: increased pain, loss of quadriceps contraction, increased knee swelling, loss of knee extension and flexion range of motion, loss of quadriceps, hamstring, and hip strength, inability to go up and down stairs, squat, sit, sleep, and stand for prolonged periods of time; antalgic gait and asymmetrical gait pattern, and an inability to perform daily, recreational, and work related activity.     He would benefit from skilled physical therapy services in order to address the above impairments to allow for full functional return and return to recreation. Reviewed a home program with the patient, focus will be given to addressing pain, swelling, and quadriceps weakness, improving knee joint range of motion on the left, and working towards squatting, sitting, and recreational activity as the healing process occurs. The patient appeared to understand the plan of care and was in agreement on her home program.  He will attend physical therapy for 2 visits a week, for 1 month in order to address the above impairments. Goals to be met in 4-6 weeks-  1. Patient will decrease pain to 0-1/10 in order to tolerate daily activity including sitting, squatting, standing, and sleeping  2. Patient will be able to perform 10 double leg squats without compensation and without knee discomfort to prepare for daily activity  3. Patient will be able to demonstrate the ability to go up and down 1 flight of stairs without increased knee pain  4. Patient will be able to demonstrate a symmetrical gait pattern, with proper quad control, on the left  5. The patient will be independent with a HEP  6. The patient will obtain 120 degrees left knee flexion        An electronic signature was used to authenticate this note.   -- Deja Menendez, PT

## 2022-10-12 ENCOUNTER — OFFICE VISIT (OUTPATIENT)
Dept: ORTHOPEDIC SURGERY | Age: 67
End: 2022-10-12
Payer: MEDICARE

## 2022-10-12 DIAGNOSIS — Z98.890 STATUS POST KNEE SURGERY: ICD-10-CM

## 2022-10-12 DIAGNOSIS — M25.562 ACUTE PAIN OF LEFT KNEE: Primary | ICD-10-CM

## 2022-10-12 PROCEDURE — 97110 THERAPEUTIC EXERCISES: CPT | Performed by: PHYSICAL THERAPIST

## 2022-10-12 PROCEDURE — 97161 PT EVAL LOW COMPLEX 20 MIN: CPT | Performed by: PHYSICAL THERAPIST

## 2022-10-14 ENCOUNTER — OFFICE VISIT (OUTPATIENT)
Dept: ORTHOPEDIC SURGERY | Age: 67
End: 2022-10-14
Payer: MEDICARE

## 2022-10-14 DIAGNOSIS — M25.562 ACUTE PAIN OF LEFT KNEE: Primary | ICD-10-CM

## 2022-10-14 DIAGNOSIS — Z98.890 STATUS POST KNEE SURGERY: ICD-10-CM

## 2022-10-14 PROCEDURE — 97140 MANUAL THERAPY 1/> REGIONS: CPT | Performed by: PHYSICAL THERAPIST

## 2022-10-14 PROCEDURE — 97110 THERAPEUTIC EXERCISES: CPT | Performed by: PHYSICAL THERAPIST

## 2022-10-14 NOTE — PROGRESS NOTES
Tanya Parikh (: 1955) is a 78 y.o. male patient here for evaluation of the following chief complaint(s):  Knee Pain       Patient Name: Tanya Parikh  Date:10/14/2022  : 1955  [] Patient  Verified  Payor: Ariadnasusi Sergio / Plan: [place] MEDICARE PART A & B / Product Type: Medicare /   Total Treatment Time (min): 60  Total Timed Codes (min): 45  1:1 Treatment Time ( [address] only): 39  Visit #:     ASSESSMENT/PLAN:  Below is the assessment and plan developed based on review of pertinent history, physical exam, labs, studies, and medications. 1. Acute pain of left knee  2. Status post knee surgery    Patient did well gait training today with single point cane in right hand and no LOB. Continues to have erythema and edema from knee down to ankle, this was decreased with ankle pumps and ice. Verified that he did have a doppler a few weeks after surgery that was negative, and advised patient of symptoms to watch for and advised him to call immediately if any of these changes occur, he verbalized understanding. We will continue to progress to ambulation on all surface types without use of AD. Return in about 1 day (around 10/15/2022) for continue therapy for knee. SUBJECTIVE/OBJECTIVE:  HPI  Stated that he is performing his HEP. Reports that he is walking in the house with a cane and outside with the walker because he doesn't feel balanced yet. Stated that he has been doing 5 stairs in and out of his house but has not tried the 13 steps inside his house yet. Physical Exam    Manual Interventions: (15 Min)   Effleurage and petrissage to left lower extremity with elevation. STM to distal quadriceps. Gentle passive range of motion into flexion. Hamstring and calf stretch.      Range of Motion:  NT    Measurement/Manual Muscle Testing:  Decreased weight bearing on the right     Modalities Applied: ( 15 Min )   Ice post treatment with elevation and heel pumps    Therapeutic Exercise: ( 30 min) Sit to stands, terminal knee extensions against band resistance, calf stretch on slant board, hamstring release with lacrosse ball, supine heel prop with weight, gait. An electronic signature was used to authenticate this note.   -- Co-treated by TOMY Brown

## 2022-10-17 ENCOUNTER — OFFICE VISIT (OUTPATIENT)
Dept: ORTHOPEDIC SURGERY | Age: 67
End: 2022-10-17
Payer: MEDICARE

## 2022-10-17 DIAGNOSIS — Z98.890 STATUS POST KNEE SURGERY: ICD-10-CM

## 2022-10-17 DIAGNOSIS — M17.12 OSTEOARTHRITIS OF LEFT KNEE, UNSPECIFIED OSTEOARTHRITIS TYPE: Primary | ICD-10-CM

## 2022-10-17 PROCEDURE — 97140 MANUAL THERAPY 1/> REGIONS: CPT

## 2022-10-17 PROCEDURE — 97110 THERAPEUTIC EXERCISES: CPT

## 2022-10-17 NOTE — PROGRESS NOTES
Suzette Self (: 1955) is a 78 y.o. male patient here for evaluation of the following chief complaint(s):  Knee Pain       Patient Name: Suzette Self  Date:10/17/2022  : 1955  [] Patient  Verified  Payor: Jarred Luis Eduardo / Plan: [place] MEDICARE PART A & B / Product Type: Medicare /   Total Treatment Time (min): 60  Total Timed Codes (min): 45  1:1 Treatment Time ( [address] only): 39  Visit #: 3 of 25    ASSESSMENT/PLAN:  Below is the assessment and plan developed based on review of pertinent history, physical exam, labs, studies, and medications. 1. Osteoarthritis of left knee, unspecified osteoarthritis type  2. Status post knee surgery    Patient left leg edema and erythema decreased today. Ambulation is more steady with SPC and he was able to steady himself after LOB stepping over 7inch cups. Stair training was difficult today with mostly upper extremity use in order to perform, required CGA We will continue to work on strength in order to improve functional mobility. Return in about 2 days (around 10/19/2022) for continue therapy for knee. SUBJECTIVE/OBJECTIVE:  HPI  Patient stated that he walked 48 ft with no AD. He continues to use his cane and is walking further outside in the neighborhood. He tried stairs while using both hand rails, good leg up bad leg down, and was able to complete this at his house. Physical Exam    Manual Interventions: (15 Min)   Effleurage and petrissage to left lower extremity with elevation. STM to distal quadriceps. Gentle passive range of motion into flexion. Hamstring and calf stretch.      Range of Motion:  NT    Measurement/Manual Muscle Testing:  Decreased weight bearing on the right     Modalities Applied: ( 15 Min )   Ice post treatment with elevation and heel pumps    Therapeutic Exercise: ( 30 min)   Sit to stands, terminal knee extensions against band resistance, calf stretch on slant board, hamstring release with lacrosse ball, supine heel prop with weight, gait, step ups. An electronic signature was used to authenticate this note.   -- Co-treated by TOMY Holder

## 2022-10-20 ENCOUNTER — OFFICE VISIT (OUTPATIENT)
Dept: ORTHOPEDIC SURGERY | Age: 67
End: 2022-10-20
Payer: MEDICARE

## 2022-10-20 DIAGNOSIS — Z98.890 STATUS POST KNEE SURGERY: ICD-10-CM

## 2022-10-20 DIAGNOSIS — M17.12 OSTEOARTHRITIS OF LEFT KNEE, UNSPECIFIED OSTEOARTHRITIS TYPE: Primary | ICD-10-CM

## 2022-10-20 PROCEDURE — 97110 THERAPEUTIC EXERCISES: CPT | Performed by: PHYSICAL THERAPIST

## 2022-10-20 PROCEDURE — 97140 MANUAL THERAPY 1/> REGIONS: CPT | Performed by: PHYSICAL THERAPIST

## 2022-10-20 NOTE — PROGRESS NOTES
Rocio Regan (: 1955) is a 78 y.o. male patient here for evaluation of the following chief complaint(s):  Knee Pain       Patient Name: Rocio Regan  Date:10/20/2022  : 1955  [] Patient  Verified  Payor: Yoliskye Leal / Plan: [place] MEDICARE PART A & B / Product Type: Medicare /   Total Treatment Time (min): 60  Total Timed Codes (min): 45  1:1 Treatment Time ( [address] only): 39  Visit #: 3 of 25    ASSESSMENT/PLAN:  Below is the assessment and plan developed based on review of pertinent history, physical exam, labs, studies, and medications. 1. Osteoarthritis of left knee, unspecified osteoarthritis type  2. Status post knee surgery    Patient has made steady gait training gains, ambulating throughout the clinic today without use of AD. We will continue to work on strength in order to ascend stairs with affected leg. We will work on stair training next session. Educated patient to stretch hamstrings in order to improve posterior tightness. Return in about 1 day (around 10/21/2022) for continue therapy for knee. SUBJECTIVE/OBJECTIVE:  HPI  Patient reports walking 1100 steps yesterday without use of AD on multiple community surfaces with no issues. He is still sleeping with his feet elevated on a pillow, and he is most stiff in the mornings. Stated that he is still unable to ascend stairs with his affected knee, feels like it \" might buckle\". 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 Physical Exam    Manual Interventions: (15 Min)   STM to distal quadriceps. Passive range of motion into flexion. Hamstring and calf stretch. Patellar mobilizations. Tib-Fib glides in supine.      Range of Motion:  Right- 0-127  Left - 0-133     Measurement/Manual Muscle Testing:  Decreased weight bearing on the right     Modalities Applied: ( 15 Min )   Ice post treatment with elevation and heel pumps    Therapeutic Exercise: ( 30 min)   Sit to stands, terminal knee extensions against band resistance, calf stretch on slant board, hamstring release with lacrosse ball, supine heel prop with weight, gait, step ups. An electronic signature was used to authenticate this note.   -- Co-treated by TOMY Jin

## 2022-10-25 ENCOUNTER — OFFICE VISIT (OUTPATIENT)
Dept: ORTHOPEDIC SURGERY | Age: 67
End: 2022-10-25
Payer: MEDICARE

## 2022-10-25 DIAGNOSIS — M17.12 OSTEOARTHRITIS OF LEFT KNEE, UNSPECIFIED OSTEOARTHRITIS TYPE: Primary | ICD-10-CM

## 2022-10-25 DIAGNOSIS — Z98.890 STATUS POST KNEE SURGERY: ICD-10-CM

## 2022-10-25 PROCEDURE — 97110 THERAPEUTIC EXERCISES: CPT

## 2022-10-25 PROCEDURE — 97140 MANUAL THERAPY 1/> REGIONS: CPT

## 2022-10-25 NOTE — PROGRESS NOTES
Khushboo Campo (: 1955) is a 78 y.o. male patient here for evaluation of the following chief complaint(s):  Knee Pain       Patient Name: Khushboo Campo  Date:10/25/2022  : 1955  [] Patient  Verified  Payor: Michelle Haleigh / Plan: [place] MEDICARE PART A & B / Product Type: Medicare /   Total Treatment Time (min): 60  Total Timed Codes (min): 45  1:1 Treatment Time ([facility] only): 39  Visit #: 3 of 25    ASSESSMENT/PLAN:  Below is the assessment and plan developed based on review of pertinent history, physical exam, labs, studies, and medications. 1. Osteoarthritis of left knee, unspecified osteoarthritis type  2. Status post knee surgery    Katiuska Gregorio continues to make improvement each session with increased mobility. Gait looks more steady this session with more flexion during swing phase. Continues to have mild swelling and medial joint line pain. We will work on stair training next session to improve confidence of patient. Return in about 2 days (around 10/27/2022) for continue therapy for knee. SUBJECTIVE/OBJECTIVE:  HPI  Stated that he has been walking without the cane, carrying it for mental support, for the past week. Stated that he has been working on stretching his     Physical Exam    Manual Interventions: (15 Min)   STM to distal quadriceps. Passive range of motion into flexion. Hamstring and calf stretch. Patellar mobilizations. Tib-Fib glides in supine. Range of Motion:  Right- 0-127  Left - 0-133     Measurement/Manual Muscle Testing:  Decreased weight bearing on the right     Modalities Applied: ( 15 Min )   Ice post treatment with elevation and heel pumps    Therapeutic Exercise: ( 30 min)   Sit to stands, terminal knee extensions against band resistance, calf stretch on slant board, hamstring release with lacrosse ball, supine heel prop with weight, gait, step ups. An electronic signature was used to authenticate this note.   -- Co-treated by TOMY Molina

## 2022-10-27 ENCOUNTER — OFFICE VISIT (OUTPATIENT)
Dept: ORTHOPEDIC SURGERY | Age: 67
End: 2022-10-27
Payer: MEDICARE

## 2022-10-27 DIAGNOSIS — M17.12 OSTEOARTHRITIS OF LEFT KNEE, UNSPECIFIED OSTEOARTHRITIS TYPE: Primary | ICD-10-CM

## 2022-10-27 DIAGNOSIS — Z98.890 STATUS POST KNEE SURGERY: ICD-10-CM

## 2022-10-27 PROCEDURE — 97110 THERAPEUTIC EXERCISES: CPT

## 2022-10-27 PROCEDURE — 97140 MANUAL THERAPY 1/> REGIONS: CPT

## 2022-10-27 NOTE — PROGRESS NOTES
Suzette Self (: 1955) is a 78 y.o. male patient here for evaluation of the following chief complaint(s):  Knee Pain       Patient Name: Suzette Self  Date:10/27/2022  : 1955  [] Patient  Verified  Payor: Jarred Luis Eduardo / Plan: [place] MEDICARE PART A & B / Product Type: Medicare /   Total Treatment Time (min): 60  Total Timed Codes (min): 45  1:1 Treatment Time ( [address] only): 39  Visit #:     ASSESSMENT/PLAN:  Below is the assessment and plan developed based on review of pertinent history, physical exam, labs, studies, and medications. 1. Osteoarthritis of left knee, unspecified osteoarthritis type  2. Status post knee surgery    Patient did well with forward and lateral step ups controlling concentric and eccentric motion on affected leg. Most limited by quad strength and SL balance, he has been instructed on exercises in order to decrease this deficit. We will continue to progress long term goals of symmetrical gait pattern and ability to ascend and descend stairs without discomfort. Return in about 5 days (around 2022) for continue therapy for knee pain. SUBJECTIVE/OBJECTIVE:  HPI  Stated that he walked 1300 steps in his neighborhood, this included some slight inclines. Reports that walking up hill is more challenging than going down. Physical Exam    Manual Interventions: (15 Min)   STM to distal quadriceps. Passive range of motion into flexion. Hamstring and calf stretch. Patellar mobilizations. Tib-Fib glides in supine.      Anthropometric: left knee circumference  10 cm from inferior scar line- 46.5 cm     Range of Motion: 10/20/22  Right- 0-127  Left - 0-133     Measurement/Manual Muscle Testing:  Decreased weight bearing on the right     Modalities Applied: ( 15 Min )   Ice post treatment with elevation and heel pumps    Therapeutic Exercise: ( 30 min)   Sit to stands, terminal knee extensions against band resistance, calf stretch on slant board, hamstring release with lacrosse ball, supine heel prop with weight, gait, step ups forward/ lateral.     An electronic signature was used to authenticate this note.   -- Co-treated by TOMY Duncan

## 2022-11-01 ENCOUNTER — OFFICE VISIT (OUTPATIENT)
Dept: ORTHOPEDIC SURGERY | Age: 67
End: 2022-11-01
Payer: MEDICARE

## 2022-11-01 DIAGNOSIS — M17.12 OSTEOARTHRITIS OF LEFT KNEE, UNSPECIFIED OSTEOARTHRITIS TYPE: Primary | ICD-10-CM

## 2022-11-01 DIAGNOSIS — Z98.890 STATUS POST KNEE SURGERY: ICD-10-CM

## 2022-11-01 PROCEDURE — 97110 THERAPEUTIC EXERCISES: CPT

## 2022-11-01 PROCEDURE — 97140 MANUAL THERAPY 1/> REGIONS: CPT

## 2022-11-01 NOTE — PROGRESS NOTES
Jamie Stockton (: 1955) is a 78 y.o. male patient here for evaluation of the following chief complaint(s):  Knee Pain       Patient Name: Jamei Stockton  Date:2022  : 1955  [] Patient  Verified  Payor: Masha Tay / Plan: [place] MEDICARE PART A & B / Product Type: Medicare /   Total Treatment Time (min): 60  Total Timed Codes (min): 45  1:1 Treatment Time ( [address] only): 39  Visit #:     ASSESSMENT/PLAN:  Below is the assessment and plan developed based on review of pertinent history, physical exam, labs, studies, and medications. 1. Osteoarthritis of left knee, unspecified osteoarthritis type  2. Status post knee surgery    Patient has made good progress, ambulating without devise and SLS more than 10 seconds on left, since last session with proactive involvment of home exercise program. He continues to have hesitation descending stairs with no hand rail but has been directed on how to work on this at home. Encouraged patient to stretch hamstrings and quads and have provided stretches. We will continue to progress long term goals of symmetrical gait pattern and ability to ascend and descend stairs without discomfort. Return in about 2 days (around 11/3/2022) for continue therapy for knee pain. SUBJECTIVE/OBJECTIVE:  HPI  Reports walking 2300 steps in his neighborhood. Physical Exam    Manual Interventions: (15 Min)   STM to distal quadriceps. Passive range of motion into flexion. Hamstring and calf stretch. Patellar mobilizations. Tib-Fib glides in supine.      Anthropometric: left knee circumference  10 cm from inferior scar line- 46.5 cm     Range of Motion: 10/20/22  Right- 0-127  Left - 0-133     Measurement/Manual Muscle Testing:  Able to perform stairs with step to pattern on 7 in stair with no assistance or railing, ambulating without use of AD    Modalities Applied: ( 15 Min )   Ice post treatment with modified heel prop    Therapeutic Exercise: ( 30 min)   SLS level surface, hamstring stretch, quad stretch, heel prop, squats, slant, shuttle SL/DL, band walk, steps, hamstring release with ball. An electronic signature was used to authenticate this note.   -- Co-treated by TOMY Stephens

## 2022-11-03 ENCOUNTER — OFFICE VISIT (OUTPATIENT)
Dept: ORTHOPEDIC SURGERY | Age: 67
End: 2022-11-03
Payer: MEDICARE

## 2022-11-03 DIAGNOSIS — M25.562 ACUTE PAIN OF LEFT KNEE: ICD-10-CM

## 2022-11-03 DIAGNOSIS — Z98.890 STATUS POST KNEE SURGERY: Primary | ICD-10-CM

## 2022-11-03 PROCEDURE — 97140 MANUAL THERAPY 1/> REGIONS: CPT

## 2022-11-03 PROCEDURE — 97110 THERAPEUTIC EXERCISES: CPT

## 2022-11-03 NOTE — PROGRESS NOTES
Red Orantes (: 1955) is a 78 y.o. male patient here for evaluation of the following chief complaint(s):  Knee Pain       Patient Name: Red Orantes  Date:11/3/2022  : 1955  [] Patient  Verified  Payor: Tico Memory / Plan: [place] MEDICARE PART A & B / Product Type: Medicare /   Total Treatment Time (min): 60  Total Timed Codes (min): 45  1:1 Treatment Time ( [address] only): 39  Visit #: 8     ASSESSMENT/PLAN:  Below is the assessment and plan developed based on review of pertinent history, physical exam, labs, studies, and medications. 1. Status post knee surgery  2. Acute pain of left knee    Patient making steady strength gains in order to ascend and descend stairs with better stability using 1 HR for LOB. Continues to have mild deficit in SLS that he continues to work on at home. Increased edema in LE after activity today with, decreased with supine position and heel press. We will work on squatting, proper quad control during gait, and improved hamstring flexibility. Return in about 5 days (around 2022) for continue therapy for knee pain. SUBJECTIVE/OBJECTIVE:  HPI    Patient stated that stairs are getting easier. He reports that he is walking his property, uneven ground, without his cane and is doing well with this. Physical Exam    Manual Interventions: (15 Min)   STM to distal quadriceps. Passive range of motion into flexion. Hamstring and calf stretch. Patellar mobilizations. Tib-Fib glides in supine. Anthropometric:     Range of Motion: 10/20/22  Right- 0-127  Left - 0-133     Measurement/Manual Muscle Testing:      Modalities Applied: ( 15 Min )   Ice post treatment with modified heel prop    Therapeutic Exercise: ( 30 min)   SLS level surface, hamstring stretch, quad stretch, heel prop, squats, slant, shuttle SL/DL, band walk, steps, hamstring release with ball. An electronic signature was used to authenticate this note.   -- Co-treated by TOMY Santo

## 2022-11-08 ENCOUNTER — OFFICE VISIT (OUTPATIENT)
Dept: ORTHOPEDIC SURGERY | Age: 67
End: 2022-11-08
Payer: MEDICARE

## 2022-11-08 DIAGNOSIS — M25.562 ACUTE PAIN OF LEFT KNEE: ICD-10-CM

## 2022-11-08 DIAGNOSIS — Z98.890 STATUS POST KNEE SURGERY: Primary | ICD-10-CM

## 2022-11-08 PROCEDURE — 97140 MANUAL THERAPY 1/> REGIONS: CPT

## 2022-11-08 PROCEDURE — 97110 THERAPEUTIC EXERCISES: CPT

## 2022-11-08 NOTE — PROGRESS NOTES
Jose Small (: 1955) is a 78 y.o. male patient here for evaluation of the following chief complaint(s):  Knee Pain       Patient Name: Jose Small  Date:2022  : 1955  [] Patient  Verified  Payor: Jose Luna / Plan: [place] MEDICARE PART A & B / Product Type: Medicare /   Total Treatment Time (min): 60  Total Timed Codes (min): 45  1:1 Treatment Time ( [address] only): 39  Visit #: 10 of 25    ASSESSMENT/PLAN:  Below is the assessment and plan developed based on review of pertinent history, physical exam, labs, studies, and medications. 1. Status post knee surgery  2. Acute pain of left knee    Patient is tolerating daily activities well including squatting, sleeping, and ascending and descending stairs with affected leg with 0/10 knee pain. He has achieved 20+ seconds of SLS on left leg has been instructed to continue to improve SLS on contralateral side to improve strength for symmetrical gait pattern. We will continue to work on standing tolerance for patient to ambulate for longer distances without fatigue. Return in about 2 days (around 11/10/2022) for continue therapy for knee pain. SUBJECTIVE/OBJECTIVE:  HPI    Patient stated that he is able to don and doff shoes and is able to perform daily activities without pain including stairs. He would like to be able to walk for longer distances. Anthropometric: 22 ( no increase since last measurement )   left knee circumference  10 cm from inferior scar line- 46 cm     Physical Exam    Manual Interventions: (15 Min)   STM to distal quadriceps. Passive range of motion into flexion. Hamstring and calf stretch. Patellar mobilizations. Tib-Fib glides in supine.      Anthropometric:     Range of Motion: 10/20/22  Right- 0-127  Left - 0-133     Measurement/Manual Muscle Testing:      Modalities Applied: ( 15 Min )   Ice post treatment with modified heel prop    Therapeutic Exercise: ( 30 min)   SLS level surface, hamstring stretch, quad stretch, heel prop, squats, slant, shuttle SL/DL, band walk, steps, hamstring release with ball. An electronic signature was used to authenticate this note.   -- Co-treated by TOMY Stephens

## 2022-11-10 ENCOUNTER — OFFICE VISIT (OUTPATIENT)
Dept: ORTHOPEDIC SURGERY | Age: 67
End: 2022-11-10
Payer: MEDICARE

## 2022-11-10 DIAGNOSIS — Z98.890 STATUS POST KNEE SURGERY: Primary | ICD-10-CM

## 2022-11-10 DIAGNOSIS — M25.562 ACUTE PAIN OF LEFT KNEE: ICD-10-CM

## 2022-11-10 PROCEDURE — 97140 MANUAL THERAPY 1/> REGIONS: CPT

## 2022-11-10 PROCEDURE — 97110 THERAPEUTIC EXERCISES: CPT

## 2022-11-10 NOTE — PROGRESS NOTES
Arleth Manley (: 1955) is a 78 y.o. male patient here for evaluation of the following chief complaint(s):  Knee Pain       Patient Name: Arleth Manley  Date:11/10/2022  : 1955  [] Patient  Verified  Payor: Tamera Fabiana / Plan: [place] MEDICARE PART A & B / Product Type: Medicare /   Total Treatment Time (min): 60  Total Timed Codes (min): 45  1:1 Treatment Time ( [address] only): 39  Visit #: 10 of 25    ASSESSMENT/PLAN:  Below is the assessment and plan developed based on review of pertinent history, physical exam, labs, studies, and medications. 1. Status post knee surgery  2. Acute pain of left knee    Patient is doing well with improving stability on all surface types in the community. Most limited with step ups with mild instability without hand rail. We will continue to work on symmetrical gait pattern with good quad control for community ambulation without LOB. Return in about 5 days (around 11/15/2022) for continue therapy for knee pain. SUBJECTIVE/OBJECTIVE:  HPI    Patient stated that he walked [place] 1.0 mile on all surface types with no issues. Anthropometric: 22 ( no increase since last measurement )   left knee circumference  10 cm from inferior scar line- 46 cm     Physical Exam    Manual Interventions: (15 Min)   STM to distal quadriceps. Passive range of motion into flexion. Hamstring and calf stretch. Patellar mobilizations. Tib-Fib glides in supine. Anthropometric:     Range of Motion: 10/20/22  Right- 0-127  Left - 0-133     Measurement/Manual Muscle Testing:      Modalities Applied: ( 15 Min )   Ice post treatment with modified heel prop    Therapeutic Exercise: ( 30 min)   SLS level surface, hamstring stretch, quad stretch, heel prop, squats, slant, shuttle SL/DL, band walk, steps, hamstring release with ball. An electronic signature was used to authenticate this note.   -- Co-treated by TOMY Toney

## 2022-11-14 ENCOUNTER — OFFICE VISIT (OUTPATIENT)
Dept: ORTHOPEDIC SURGERY | Age: 67
End: 2022-11-14
Payer: MEDICARE

## 2022-11-14 VITALS — WEIGHT: 238 LBS | BODY MASS INDEX: 31.54 KG/M2 | HEIGHT: 73 IN

## 2022-11-14 DIAGNOSIS — Z98.890 STATUS POST KNEE SURGERY: Primary | ICD-10-CM

## 2022-11-14 PROCEDURE — 99024 POSTOP FOLLOW-UP VISIT: CPT | Performed by: ORTHOPAEDIC SURGERY
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
## 2022-11-15 ENCOUNTER — OFFICE VISIT (OUTPATIENT)
Dept: ORTHOPEDIC SURGERY | Age: 67
End: 2022-11-15
Payer: MEDICARE

## 2022-11-15 DIAGNOSIS — M25.562 ACUTE PAIN OF LEFT KNEE: ICD-10-CM

## 2022-11-15 DIAGNOSIS — Z98.890 STATUS POST KNEE SURGERY: Primary | ICD-10-CM

## 2022-11-15 PROCEDURE — 97110 THERAPEUTIC EXERCISES: CPT

## 2022-11-15 PROCEDURE — 97140 MANUAL THERAPY 1/> REGIONS: CPT

## 2022-11-15 NOTE — PROGRESS NOTES
Nina Marks (: 1955) is a 78 y.o. male patient here for evaluation of the following chief complaint(s):  Knee Pain       Patient Name: Nina Marks  Date:11/15/2022  : 1955  [] Patient  Verified  Payor: Jie Essence / Plan: [place] / Product Type: Medicare /   Total Treatment Time (min): 60  Total Timed Codes (min): 45  1:1 Treatment Time ( [address] only): 39  Visit #:     ASSESSMENT/PLAN:  Below is the assessment and plan developed based on review of pertinent history, physical exam, labs, studies, and medications. 1. Status post knee surgery  2. Acute pain of left knee    Patient has achieved functional range of motion and adequate strength to perform daily activities without pain. Continues to have tight hamstrings and mild instability on affected side but he has been instructed on how to work on this deficit at home. He had his follow up with the MD and they were pleased with his results. He will follow up with Dr. Addison Phillips in a year unless issues arise prior to this date. He is compliant with his home exercise program and will meet with us 1 more time this week and plan to go to 1x a week after this to prepare him for independent program.      Return in about 2 days (around 2022) for continue therapy for knee pain. SUBJECTIVE/OBJECTIVE:  HPI    Patient stated that [place] 1[place]0 mile on all surface types with no issues. Anthropometric: 22 ( no increase since last measurement )   left knee circumference  10 cm from inferior scar line- 46 cm     Physical Exam    Manual Interventions: (15 Min)   STM to distal quadriceps. Passive range of motion into flexion. Hamstring and calf stretch. Patellar mobilizations. Tib-Fib glides in supine.      Anthropometric:     Range of Motion: 11/15/22  Right- 0-127  Left - 0-129    Measurement/Manual Muscle Testing:      Modalities Applied: ( 15 Min )   Ice post treatment with modified heel prop    Therapeutic Exercise: ( 30 min)   SLS level surface, hamstring stretch, quad stretch, heel prop, squats, slant, shuttle SL/DL, band walk, steps, hamstring release with ball. An electronic signature was used to authenticate this note.   -- Co-treated by TOMY Santo

## 2022-11-17 ENCOUNTER — OFFICE VISIT (OUTPATIENT)
Dept: ORTHOPEDIC SURGERY | Age: 67
End: 2022-11-17
Payer: MEDICARE

## 2022-11-17 DIAGNOSIS — M25.562 ACUTE PAIN OF LEFT KNEE: ICD-10-CM

## 2022-11-17 DIAGNOSIS — Z98.890 STATUS POST KNEE SURGERY: Primary | ICD-10-CM

## 2022-11-17 PROCEDURE — 97140 MANUAL THERAPY 1/> REGIONS: CPT

## 2022-11-17 PROCEDURE — 97110 THERAPEUTIC EXERCISES: CPT

## 2022-11-17 NOTE — PROGRESS NOTES
Shoshana Garcia (: 1955) is a 78 y.o. male patient here for evaluation of the following chief complaint(s):  Knee Pain       Patient Name: Shoshana Garcia  Date:2022  : 1955  [] Patient  Verified  Payor: Fern Counter / Plan: [place] MED[place]ARE PART [place] & [place] / Product Type: Medicare /   Total Treatment Time (min): 60  Total Timed Codes (min): 45  1:1 Treatment Time ( [address] only): 39  Visit #:     ASSESSMENT/PLAN:  Below is the assessment and plan developed based on review of pertinent history, physical exam, labs, studies, and medications. 1. Status post knee surgery  2. Acute pain of left knee    Patient is doing well overall completing iADLs without pain. Continues to have decreased tolerance to stationary standing and confidence with stairs in the community. We will continue to build LE strength in order to increase endurance to standing and confidence for community ambulation. Return in about 5 days (around 2022) for continue therapy for knee pain. SUBJECTIVE/OBJECTIVE:  HPI    Stated that he had pain after standing for 2 hours. Anthropometric: 22 ( no increase since last measurement )   left knee circumference  10 cm from inferior scar line- 46 cm     Physical Exam    Manual Interventions: (15 Min)   STM to distal quadriceps. Passive range of motion into flexion. Hamstring, adductor, and calf stretch. Patellar mobilizations. Tib-Fib glides in supine. Anthropometric:     Range of Motion: 11/15/22  Right- 0-127  Left - 0-129    Measurement/Manual Muscle Testing:      Modalities Applied: ( 15 Min )   Ice post treatment with modified heel prop    Therapeutic Exercise: ( 30 min)   SLS level surface, hamstring stretch, quad stretch, heel prop, squats, slant, shuttle SL/DL, band walk, steps, hamstring release with ball. An electronic signature was used to authenticate this note.   -- Co-treated by TOMY Ponce

## 2022-11-22 ENCOUNTER — OFFICE VISIT (OUTPATIENT)
Dept: ORTHOPEDIC SURGERY | Age: 67
End: 2022-11-22
Payer: MEDICARE

## 2022-11-22 DIAGNOSIS — Z98.890 STATUS POST KNEE SURGERY: Primary | ICD-10-CM

## 2022-11-22 DIAGNOSIS — M25.562 ACUTE PAIN OF LEFT KNEE: ICD-10-CM

## 2022-11-22 PROCEDURE — 97110 THERAPEUTIC EXERCISES: CPT

## 2022-11-22 PROCEDURE — 97140 MANUAL THERAPY 1/> REGIONS: CPT

## 2022-11-22 NOTE — PROGRESS NOTES
Soledad Velasquez (: 1955) is a 78 y.o. male patient here for evaluation of the following chief complaint(s):  Knee Pain       Patient Name: Soledad Velasquez  Date:2022  : 1955  [] Patient  Verified  Payor: Nanda Myles / Plan: [place] MEDICARE PART A & B / Product Type: Medicare /   Total Treatment Time (min): 60  Total Timed Codes (min): 45  1:1 Treatment Time ( [address] only): 39  Visit #:     ASSESSMENT/PLAN:  Below is the assessment and plan developed based on review of pertinent history, physical exam, labs, studies, and medications. 1. Status post knee surgery    Patient with increased quad strength pressing 62 pounds on quad press today. He continues to improve endurance with ambulation. Continues to require some UE support in order to maintain balance in the on uneven surfaces and with stairs in the community. We will continue to build LE strength in order to increase endurance to standing and confidence for community ambulation. Return in about 1 week (around 2022) for continue therapy for knee pain. SUBJECTIVE/OBJECTIVE:  HPI    Stated that he walked 2 miles and was feeling good after. Patient is walking every morning at [employer]. He has been standing to cook more and performing more daily activities with less pain. Physical Exam    Manual Interventions: (15 Min)   STM to distal quadriceps. Passive range of motion into flexion. Hamstring, adductor, and calf stretch. Patellar mobilizations. Tib-Fib glides in supine. Extension mobilizations. ROM: NT    Measurement/Manual Muscle Testing:      Modalities Applied: ( 15 Min )   Ice post treatment with modified heel prop    Therapeutic Exercise: ( 30 min)   SLS level surface, hamstring stretch, quad stretch, heel prop, squats, slant, shuttle SL/DL, band walk, squats, step ups, hamstring release with ball. An electronic signature was used to authenticate this note.   -- Co-treated by Merlin Aguas, LPTA

## 2022-11-29 ENCOUNTER — OFFICE VISIT (OUTPATIENT)
Dept: ORTHOPEDIC SURGERY | Age: 67
End: 2022-11-29
Payer: MEDICARE

## 2022-11-29 DIAGNOSIS — Z98.890 STATUS POST KNEE SURGERY: Primary | ICD-10-CM

## 2022-11-29 DIAGNOSIS — M25.562 ACUTE PAIN OF LEFT KNEE: ICD-10-CM

## 2022-11-29 PROCEDURE — 97140 MANUAL THERAPY 1/> REGIONS: CPT

## 2022-11-29 PROCEDURE — 97110 THERAPEUTIC EXERCISES: CPT

## 2022-11-29 NOTE — PROGRESS NOTES
Arleth Manley (: 1955) is a 78 y.o. male patient here for evaluation of the following chief complaint(s):  Knee Pain       Patient Name: Arleth Manley  Date:2022  : 1955  [] Patient  Verified  Payor: Tamera Jung / Plan: [place] MEDICARE PART A & B / Product Type: Medicare /   Total Treatment Time (min): 60  Total Timed Codes (min): 45  1:1 Treatment Time ( [address] only): 39  Visit #: 15 of 25    ASSESSMENT/PLAN:  Below is the assessment and plan developed based on review of pertinent history, physical exam, labs, studies, and medications. 1. Status post knee surgery  2. Acute pain of left knee    Patient is doing well overall and has achieved functional range of motion in order to tolerate daily activities including sitting, standing, squatting, and sleeping without pain. Continues to have decreased endurance to standing but anticipate this will increase over time. We have reviewed and given an updated handout of his HEP for him to continue for the next 2 weeks independlty, he will follow up and anticipate discharge at this time. Return in about 8 days (around 2022) for continue therapy for knee pain. SUBJECTIVE/OBJECTIVE:  HPI      Patient reports he still has some pain after standing stationary for long periods of time, 3 hours cooking. He is now walking his neighborhood with 1lb weights around his ankle to improve strength. Physical Exam    Manual Interventions: (15 Min)   STM to distal quadriceps. Passive range of motion into flexion. Hamstring, adductor, and calf stretch. Patellar mobilizations. ROM: NT    Measurement/Manual Muscle Testing:      Modalities Applied: ( 15 Min )   Ice post treatment with modified heel prop    Therapeutic Exercise: ( 30 min)   SLS level surface, hamstring stretch, quad stretch, heel prop, squats, slant, shuttle SL/DL, band walk, squats, step ups, hamstring release with ball.      An electronic signature was used to authenticate this note.  -- Co-treated by Laverna Moritz, LPTA

## 2022-12-13 ENCOUNTER — OFFICE VISIT (OUTPATIENT)
Dept: ORTHOPEDIC SURGERY | Age: 67
End: 2022-12-13
Payer: MEDICARE

## 2022-12-13 DIAGNOSIS — Z98.890 STATUS POST KNEE SURGERY: Primary | ICD-10-CM

## 2022-12-13 DIAGNOSIS — M25.562 ACUTE PAIN OF LEFT KNEE: ICD-10-CM

## 2022-12-13 PROCEDURE — 97140 MANUAL THERAPY 1/> REGIONS: CPT

## 2022-12-13 PROCEDURE — 97110 THERAPEUTIC EXERCISES: CPT

## 2022-12-13 NOTE — PROGRESS NOTES
Florence Wiggins (: 1955) is a 78 y.o. male patient here for evaluation of the following chief complaint(s):  Knee Pain       Patient Name: Florence Wiggins  Date:2022  : 1955  [] Patient  Verified  Payor: Henok Moise / Plan: [place] MEDICARE PART A & B / Product Type: Medicare /   Total Treatment Time (min): 60  Total Timed Codes (min): 45  1:1 Treatment Time ([facility] only): 39  Visit #: 15 of 25    ASSESSMENT/PLAN:  Below is the assessment and plan developed based on review of pertinent history, physical exam, labs, studies, and medications. 1. Status post knee surgery  2. Acute pain of left knee    Patient is doing well overall with mild tightness after sedentary behavior. He is independent with his exercises is able to alleviate symptoms when they arise. I have answered all questions and concerns that patient had and gave contact information. Patient is to continue with an independent program at this time and will follow up if anything changes. Return if symptoms worsen or fail to improve. SUBJECTIVE/OBJECTIVE:  HPI    Reports that he has some muscle tightness in the morning and after stinting for prolonged periods of time, he is stretching in order to improve this. Physical Exam    Manual Interventions: (15 Min)   STM to distal quadriceps. Passive range of motion into flexion. Hamstring, adductor, and calf stretch. Patellar mobilizations. ROM: Full     Measurement/Manual Muscle Testing:      Modalities Applied: ( 15 Min )   Ice post treatment with modified heel prop    Therapeutic Exercise: ( 30 min)   SLS level surface, hamstring stretch, quad stretch, heel prop, squats, slant, shuttle SL/DL, band walk, squats, step ups, hamstring release with ball. An electronic signature was used to authenticate this note.   -- Co-treated by TOMY Dickinson

## 2022-12-16 ENCOUNTER — PATIENT MESSAGE (OUTPATIENT)
Dept: ORTHOPEDIC SURGERY | Age: 67
End: 2022-12-16

## 2022-12-16 DIAGNOSIS — Z98.890 STATUS POST KNEE SURGERY: Primary | ICD-10-CM

## 2022-12-16 RX ORDER — AMOXICILLIN 500 MG/1
CAPSULE ORAL
Qty: 4 CAPSULE | Refills: 2 | Status: SHIPPED | OUTPATIENT
Start: 2022-12-16

## 2023-02-13 DIAGNOSIS — Z98.890 STATUS POST KNEE SURGERY: Primary | ICD-10-CM

## 2023-02-13 RX ORDER — MELOXICAM 7.5 MG/1
7.5 TABLET ORAL DAILY
Qty: 30 TABLET | Refills: 1 | Status: SHIPPED | OUTPATIENT
Start: 2023-02-13

## 2023-04-29 DIAGNOSIS — M17.11 PRIMARY OSTEOARTHRITIS OF RIGHT KNEE: ICD-10-CM

## 2023-04-29 RX ORDER — DICLOFENAC SODIUM 75 MG/1
TABLET, DELAYED RELEASE ORAL
Qty: 60 TABLET | Refills: 3 | Status: SHIPPED | OUTPATIENT
Start: 2023-04-29

## 2023-05-24 RX ORDER — MELOXICAM 7.5 MG/1
7.5 TABLET ORAL DAILY
COMMUNITY
Start: 2023-02-13

## 2023-05-24 RX ORDER — AMOXICILLIN 500 MG/1
CAPSULE ORAL
COMMUNITY
Start: 2022-12-16

## 2023-05-24 RX ORDER — ACETAMINOPHEN 500 MG
TABLET ORAL EVERY 6 HOURS PRN
COMMUNITY

## 2023-05-24 RX ORDER — LOSARTAN POTASSIUM 100 MG/1
100 TABLET ORAL DAILY
COMMUNITY

## 2023-05-24 RX ORDER — DICLOFENAC SODIUM 75 MG/1
1 TABLET, DELAYED RELEASE ORAL 2 TIMES DAILY
COMMUNITY
Start: 2023-04-29

## (undated) DEVICE — PREP SKN CHLRAPRP APL 26ML STR --

## (undated) DEVICE — SOLUTION IRRIG 3000ML 0.9% SOD CHL USP UROMATIC PLAS CONT

## (undated) DEVICE — BLADE SAW W0.49XL3.15IN THK0.047IN CUT THK0.047IN REPL SAG

## (undated) DEVICE — ESOPHAGEAL BALLOON DILATATION CATHETER: Brand: CRE FIXED WIRE

## (undated) DEVICE — BOWL BNE CEM MIX SPAT CURET SMARTMIX CTS

## (undated) DEVICE — SYR 20ML LL STRL LF --

## (undated) DEVICE — PENCIL SMK EVAC 10 FT BLADE ELECTRD ROCKER FOR TELSCP

## (undated) DEVICE — SUTURE MCRYL SZ 3-0 L27IN ABSRB UD L19MM PS-2 3/8 CIR PRIM Y427H

## (undated) DEVICE — T4 HOOD

## (undated) DEVICE — SUTURE STRATAFIX SYMMETRIC PDS + SZ 1 L18IN ABSRB VLT L48MM SXPP1A400

## (undated) DEVICE — DERMABOND SKIN ADH 0.7ML -- DERMABOND ADVANCED 12/BX

## (undated) DEVICE — BANDAGE COMPR M W6INXL10YD WHT BGE VELC E MTRX HK AND LOOP

## (undated) DEVICE — SUTURE VCRL 1 L27IN ABSRB CT BRAID COAT UD J281H

## (undated) DEVICE — SUTURE VCRL SZ 2-0 L36IN ABSRB UD L40MM CT 1/2 CIR J957H

## (undated) DEVICE — TRANSFER SET 3": Brand: MEDLINE INDUSTRIES, INC.

## (undated) DEVICE — PADDING CAST SPEC 6INX4YD COT --

## (undated) DEVICE — Device

## (undated) DEVICE — CONTAINER,SPECIMEN,4OZ,OR STRL: Brand: MEDLINE

## (undated) DEVICE — PADDING CST 6IN STERILE --

## (undated) DEVICE — 4-PORT MANIFOLD: Brand: NEPTUNE 2

## (undated) DEVICE — HYPODERMIC SAFETY NEEDLE: Brand: MAGELLAN

## (undated) DEVICE — SOLUTION SURG PREP 26 CC PURPREP

## (undated) DEVICE — DRAPE,EXTREMITY,89X128,STERILE: Brand: MEDLINE

## (undated) DEVICE — 2108 SERIES SAGITTAL BLADE AGGRESSIVE  (25.0 X 1.19 X 85.0MM)

## (undated) DEVICE — GLOVE SURG SZ 65 L12IN FNGR THK94MIL STD WHT LTX FREE

## (undated) DEVICE — SOLUTION IRRIG 1000ML 0.9% SOD CHL USP POUR PLAS BTL

## (undated) DEVICE — GOWN,SIRUS,NONRNF,SETINSLV,2XL,18/CS: Brand: MEDLINE

## (undated) DEVICE — BLADE SAW W098XL354IN THK0047IN CUT THK0047IN SAG

## (undated) DEVICE — VELYS ROBOT-ASSISTED SOLUTION ARRAY DRILL PIN 125 MM X 4 MM: Brand: VELYS

## (undated) DEVICE — TUBING SUCT 12FR MAL ALUM SHFT FN CAP VENT UNIV CONN W/ OBT

## (undated) DEVICE — SYR ASSEMB INFL BLLN 60ML --

## (undated) DEVICE — SPONGE GZ W4XL4IN COT 12 PLY TYP VII WVN C FLD DSGN

## (undated) DEVICE — TOTAL TRAY, 16FR 10ML SIL FOLEY, URN: Brand: MEDLINE

## (undated) DEVICE — SOLUTION IRRIG 1000ML STRL H2O USP PLAS POUR BTL

## (undated) DEVICE — SCRUB DRY SURG EZ SCRUB BRUSH PREOPERATIVE GRN

## (undated) DEVICE — TOTAL JOINT - SMH: Brand: MEDLINE INDUSTRIES, INC.

## (undated) DEVICE — GLOVE ORTHO 8   MSG9480

## (undated) DEVICE — GLOVE SURG SZ 65 L12IN FNGR THK79MIL GRN LTX FREE

## (undated) DEVICE — TUBING HYDR IRR --

## (undated) DEVICE — DRESSING HYDROCOLLOID BORDER 35X10 IN ALUM PRIMASEAL

## (undated) DEVICE — GLOVE SURG SZ 8 L12IN FNGR THK94MIL STD WHT LTX FREE